# Patient Record
Sex: FEMALE | Race: WHITE | Employment: UNEMPLOYED | URBAN - METROPOLITAN AREA
[De-identification: names, ages, dates, MRNs, and addresses within clinical notes are randomized per-mention and may not be internally consistent; named-entity substitution may affect disease eponyms.]

---

## 2019-01-28 ENCOUNTER — HOSPITAL ENCOUNTER (OUTPATIENT)
Dept: ULTRASOUND IMAGING | Age: 39
Discharge: HOME | End: 2019-01-28
Payer: COMMERCIAL

## 2019-01-28 ENCOUNTER — HOSPITAL ENCOUNTER (OUTPATIENT)
Dept: WOMENS IMAGING | Age: 39
Discharge: HOME | End: 2019-01-28
Payer: COMMERCIAL

## 2019-01-28 DIAGNOSIS — N63.0 LUMP OR MASS IN BREAST: ICD-10-CM

## 2019-01-28 PROCEDURE — G0279 TOMOSYNTHESIS, MAMMO: HCPCS

## 2019-01-28 PROCEDURE — 76642 ULTRASOUND BREAST LIMITED: CPT

## 2023-03-16 ENCOUNTER — HOSPITAL ENCOUNTER (EMERGENCY)
Age: 43
Discharge: HOME | End: 2023-03-16
Attending: EMERGENCY MEDICINE
Payer: COMMERCIAL

## 2023-03-16 ENCOUNTER — APPOINTMENT (OUTPATIENT)
Dept: GENERAL RADIOLOGY | Age: 43
End: 2023-03-16
Payer: COMMERCIAL

## 2023-03-16 VITALS
DIASTOLIC BLOOD PRESSURE: 92 MMHG | HEIGHT: 70 IN | HEART RATE: 88 BPM | BODY MASS INDEX: 30.39 KG/M2 | SYSTOLIC BLOOD PRESSURE: 144 MMHG | OXYGEN SATURATION: 98 % | TEMPERATURE: 98.4 F | RESPIRATION RATE: 16 BRPM | WEIGHT: 212.3 LBS

## 2023-03-16 DIAGNOSIS — M79.672 ACUTE FOOT PAIN, LEFT: Primary | ICD-10-CM

## 2023-03-16 PROCEDURE — 99283 EMERGENCY DEPT VISIT LOW MDM: CPT

## 2023-03-16 PROCEDURE — 6370000000 HC RX 637 (ALT 250 FOR IP): Performed by: EMERGENCY MEDICINE

## 2023-03-16 PROCEDURE — 73630 X-RAY EXAM OF FOOT: CPT

## 2023-03-16 RX ORDER — PREDNISONE 20 MG/1
60 TABLET ORAL ONCE
Status: COMPLETED | OUTPATIENT
Start: 2023-03-16 | End: 2023-03-16

## 2023-03-16 RX ADMIN — PREDNISONE 60 MG: 20 TABLET ORAL at 19:13

## 2023-03-16 ASSESSMENT — PAIN - FUNCTIONAL ASSESSMENT: PAIN_FUNCTIONAL_ASSESSMENT: 0-10

## 2023-03-16 ASSESSMENT — PAIN DESCRIPTION - DESCRIPTORS: DESCRIPTORS: ACHING;SHARP

## 2023-03-16 ASSESSMENT — PAIN DESCRIPTION - ORIENTATION: ORIENTATION: LEFT

## 2023-03-16 ASSESSMENT — PAIN DESCRIPTION - LOCATION: LOCATION: FOOT

## 2023-03-16 ASSESSMENT — PAIN SCALES - GENERAL: PAINLEVEL_OUTOF10: 2

## 2023-03-16 NOTE — ED PROVIDER NOTES
[facility] Emergency Department      Pt Name: Jadon Michaud  MRN: [id number]  [place] 1980  Date of evaluation: 3/16/2023  Provider: Cherise Gray MD  CHIEF COMPLAINT  Chief Complaint   Patient presents with    Foot Pain     Pt. C/o left foot pain onset 2 months ago, no injury, aching pain across top of foot with swelling     HPI  Jadon Michaud is a 36 y.o. female who presents because of left foot pain. She has had it for about 2 months. She has noticed a small amount of swelling to the top of her foot. She denies any known injury. She has been taking ibuprofen for pain and it does help but she is needed to take it often. She is having pain while at rest and with walking now when initially it was just with walking. REVIEW OF SYSTEMS:  No other injury, swelling Pertinent positives and negatives as per the HPI. All other pertinent review of systems reviewed and negative. Nursing notes reviewed. PAST MEDICAL HISTORY  Past Medical History:   Diagnosis Date    Endometriosis      SURGICAL HISTORY  Past Surgical History:   Procedure Laterality Date    ABDOMINOPLASTY      APPENDECTOMY      HERNIA REPAIR Bilateral     LAPAROSCOPY      x2    RHINOPLASTY Bilateral     TONSILLECTOMY       MEDICATIONS:  No current facility-administered medications on file prior to encounter. No current outpatient medications on file prior to encounter. ALLERGIES  Oxycodone-acetaminophen, Amoxicillin, Augmentin [amoxicillin-pot clavulanate], and Sulfa antibiotics  SOCIAL HISTORY:  Social History     Tobacco Use    Smoking status: Never    Smokeless tobacco: Never   Substance Use Topics    Alcohol use: Yes     Comment: very rarely    Drug use: Never     IMMUNIZATIONS:  Noncontributory    PHYSICAL EXAM  VITAL SIGNS:  Blood pressure (!) 144/92, pulse 88, temperature 98.4 °F (36.9 °C), temperature source Oral, resp.  rate 16, height 5' 10\" (1.778 m), weight 212 lb 4.9 oz (96.3 kg), last menstrual period 02/26/2023, SpO2 98 %. Constitutional:  36 y.o. female who does not appear toxic  HENT:  Atraumatic, mucous membranes moist  Eyes:   Conjunctiva clear, no icterus  Neck:  Supple, no signs of injury  Thorax & Lungs:  Respiratory effort normal  Abdomen:  Non distended  Back:  No deformity  Extremity: Foot with mild swelling to the dorsum distally near the base of the toes, no erythema, pedal pulses are normal, capillary refill is normal, ankle appears normal  Skin:  Warm, dry, as above    RESULTS / MDM:    RADIOLOGY:  Plain x-rays were viewed by me:   XR FOOT LEFT (MIN 3 VIEWS)   Final Result   No acute osseous abnormality left foot. ED COURSE:   Medications   predniSONE (DELTASONE) tablet 60 mg (60 mg Oral Given 3/16/23 1913)      History from : Patient  Limitations to history : None  Chronic Conditions:  has a past medical history of Endometriosis. Records Reviewed : None  Disposition Considerations (Tests not ordered but considered, Shared Decision Making, Pt Expectation of Test or Tx.):  MRI not deemed clinically necessary in the ED setting  Discussion with Other Profesionals : None    PROCEDURES:  None    CONSULTATIONS:  None    Emile Rodas is a 36 y.o. female who presented because of a painful foot. We did try a one time does of steroid in the ED. It does not appear infected, though it has been a long time for symptoms such as sprain, gout, tendonitis, etc.  I will refer to orthopedics. Emile Rodas was given appropriate discharge instructions. Referral to follow up provider. Differential Diagnosis:  Fracture, sprain, neurovascular injury, infection, other    FOLLOW UP:    Donya Corea MD  [facility] [address]  [address] [place] [address] Road  [phone]    Schedule an appointment as soon as possible for a visit       [facility]  [address]  [phone]  Go to   If symptoms worsen    FINAL IMPRESSION:    1.  Acute foot pain, left (Please note that I used voice recognition software to generate this note.   Occasionally words are mistranscribed despite my efforts to edit errors.)       German Munson MD  03/17/23 0993

## 2023-03-16 NOTE — ED TRIAGE NOTES
Pt. Stephanie Means left foot pain onset 2 months ago, no injury, aching pain across top of foot with swelling

## 2023-06-01 ENCOUNTER — HOSPITAL ENCOUNTER (OUTPATIENT)
Dept: WOMENS IMAGING | Age: 43
Discharge: HOME | End: 2023-06-01
Payer: COMMERCIAL

## 2023-06-01 DIAGNOSIS — Z12.31 VISIT FOR SCREENING MAMMOGRAM: ICD-10-CM

## 2023-06-01 PROCEDURE — 77063 BREAST TOMOSYNTHESIS BI: CPT

## 2023-08-31 ENCOUNTER — OFFICE VISIT (OUTPATIENT)
Dept: UROGYNECOLOGY | Age: 43
End: 2023-08-31

## 2023-08-31 VITALS
OXYGEN SATURATION: 97 % | WEIGHT: 206.6 LBS | TEMPERATURE: 97.8 F | SYSTOLIC BLOOD PRESSURE: 132 MMHG | DIASTOLIC BLOOD PRESSURE: 89 MMHG | HEART RATE: 71 BPM | BODY MASS INDEX: 29.64 KG/M2 | RESPIRATION RATE: 16 BRPM

## 2023-08-31 DIAGNOSIS — R35.0 URINARY FREQUENCY: Primary | ICD-10-CM

## 2023-08-31 DIAGNOSIS — R39.15 URINARY URGENCY: ICD-10-CM

## 2023-08-31 DIAGNOSIS — N39.3 STRESS INCONTINENCE: ICD-10-CM

## 2023-08-31 LAB
BILIRUBIN, POC: NORMAL
BLOOD URINE, POC: NORMAL
CLARITY, POC: CLEAR
COLOR, POC: YELLOW
EMPTY COUGH STRESS TEST: NORMAL
FIRST SENSATION: 100 CC
FULL COUGH STRESS TEST: NORMAL
GLUCOSE URINE, POC: NORMAL
KETONES, POC: NORMAL
LEUKOCYTE EST, POC: NORMAL
MAX SENSATION: 275 CC
NITRATE, URINE POC: NORMAL
NITRITE, POC: NORMAL
PH, POC: 6.5
POST VOID RESIDUAL (PVR): 30 ML
PROTEIN, POC: NORMAL
RBC URINE, POC: NORMAL
SECOND SENSATION: 190 CC
SPASM: NORMAL
SPECIFIC GRAVITY, POC: 1.01
UROBILINOGEN, POC: NORMAL
WBC URINE, POC: NORMAL

## 2023-08-31 RX ORDER — PANTOPRAZOLE SODIUM 40 MG/1
40 TABLET, DELAYED RELEASE ORAL DAILY
COMMUNITY
Start: 2023-08-10

## 2023-08-31 RX ORDER — FLUOXETINE HYDROCHLORIDE 20 MG/1
CAPSULE ORAL
COMMUNITY
Start: 2023-08-10

## 2023-08-31 ASSESSMENT — ENCOUNTER SYMPTOMS
ABDOMINAL DISTENTION: 1
ABDOMINAL PAIN: 1

## 2023-08-31 NOTE — PROGRESS NOTES
her return for urodynamics and a cystourethroscopy. She was given handouts on her condition. Orders Placed This Encounter   Procedures    POCT Urinalysis no Micro    Cystometrogram     No orders of the defined types were placed in this encounter.       Liz Shaw MD

## 2023-09-08 ENCOUNTER — HOSPITAL ENCOUNTER (OUTPATIENT)
Dept: MRI IMAGING | Age: 43
Discharge: HOME | End: 2023-09-08
Attending: OBSTETRICS & GYNECOLOGY
Payer: COMMERCIAL

## 2023-09-08 DIAGNOSIS — N80.9 ENDOMETRIOTIC CYST: ICD-10-CM

## 2023-09-08 DIAGNOSIS — R10.2 ADNEXAL TENDERNESS, RIGHT: ICD-10-CM

## 2023-09-08 PROCEDURE — 6360000004 HC RX CONTRAST MEDICATION: Performed by: OBSTETRICS & GYNECOLOGY

## 2023-09-08 PROCEDURE — 72197 MRI PELVIS W/O & W/DYE: CPT

## 2023-09-08 PROCEDURE — A9577 INJ MULTIHANCE: HCPCS | Performed by: OBSTETRICS & GYNECOLOGY

## 2023-09-08 RX ADMIN — GADOBENATE DIMEGLUMINE 18 ML: 529 INJECTION, SOLUTION INTRAVENOUS at 08:03

## 2023-09-19 ENCOUNTER — PROCEDURE VISIT (OUTPATIENT)
Dept: UROGYNECOLOGY | Age: 43
End: 2023-09-19
Payer: COMMERCIAL

## 2023-09-19 VITALS
OXYGEN SATURATION: 97 % | RESPIRATION RATE: 16 BRPM | TEMPERATURE: 98 F | SYSTOLIC BLOOD PRESSURE: 125 MMHG | HEART RATE: 79 BPM | DIASTOLIC BLOOD PRESSURE: 86 MMHG

## 2023-09-19 DIAGNOSIS — N39.3 STRESS INCONTINENCE: ICD-10-CM

## 2023-09-19 DIAGNOSIS — R35.0 URINARY FREQUENCY: Primary | ICD-10-CM

## 2023-09-19 DIAGNOSIS — R39.15 URINARY URGENCY: ICD-10-CM

## 2023-09-19 PROCEDURE — 99214 OFFICE O/P EST MOD 30 MIN: CPT | Performed by: OBSTETRICS & GYNECOLOGY

## 2023-09-19 PROCEDURE — 52285 CYSTOSCOPY AND TREATMENT: CPT | Performed by: OBSTETRICS & GYNECOLOGY

## 2023-09-19 NOTE — PROGRESS NOTES
2023     HPI:     Name: Fatmata Encarnacion  YOB: 1980    CC: Fatmata Encarnacion is a 36 y.o. female presenting for an evaluation of stress incontinence . Patient here today for a cystoscopy. HPI: How long have you had this problem? years  Please rate the severity of your problem: moderate  Anything make it better? Ob/Gyn History:    OB History    Para Term  AB Living   2 2 2     2   SAB IAB Ectopic Molar Multiple Live Births             2      # Outcome Date GA Lbr Vaughn/2nd Weight Sex Delivery Anes PTL Lv   2 Term      Vag-Spont   CORNELL   1 Term      Vag-Spont   CORNELL      Birth Comments: System Generated. Please review and update pregnancy details. Obstetric Comments   Has twins     Past Medical History:   Past Medical History:   Diagnosis Date    Depression     Endometriosis     History of adverse reaction to anesthesia      Past Surgical History:   Past Surgical History:   Procedure Laterality Date    ABDOMINOPLASTY      APPENDECTOMY      HERNIA REPAIR Bilateral     LAPAROSCOPY      x2    RHINOPLASTY Bilateral     TONSILLECTOMY       Current Medications:  Current Outpatient Medications   Medication Sig Dispense Refill    FLUoxetine (PROZAC) 20 MG capsule       pantoprazole (PROTONIX) 40 MG tablet Take 1 tablet by mouth daily       No current facility-administered medications for this visit. Allergies:    Allergies   Allergen Reactions    Oxycodone-Acetaminophen Nausea And Vomiting     Headache for 3 days    Amoxicillin Nausea And Vomiting    Augmentin [Amoxicillin-Pot Clavulanate] Nausea And Vomiting    Sulfa Antibiotics Nausea And Vomiting     Social History:   Social History     Socioeconomic History    Marital status:      Spouse name: Not on file    Number of children: Not on file    Years of education: Not on file    Highest education level: Not on file   Occupational History    Not on file   Tobacco Use    Smoking status: Former     Types:

## 2023-10-04 ENCOUNTER — PROCEDURE VISIT (OUTPATIENT)
Dept: UROGYNECOLOGY | Age: 43
End: 2023-10-04

## 2023-10-04 VITALS
HEART RATE: 64 BPM | DIASTOLIC BLOOD PRESSURE: 78 MMHG | SYSTOLIC BLOOD PRESSURE: 135 MMHG | TEMPERATURE: 97.8 F | RESPIRATION RATE: 18 BRPM | OXYGEN SATURATION: 98 %

## 2023-10-04 DIAGNOSIS — R35.0 URINARY FREQUENCY: Primary | ICD-10-CM

## 2023-10-04 DIAGNOSIS — N39.3 STRESS INCONTINENCE: ICD-10-CM

## 2023-10-04 DIAGNOSIS — R39.15 URINARY URGENCY: ICD-10-CM

## 2023-10-04 LAB
BILIRUBIN, POC: NORMAL
BLOOD URINE, POC: NORMAL
CLARITY, POC: CLEAR
COLOR, POC: YELLOW
GLUCOSE URINE, POC: NORMAL
KETONES, POC: NORMAL
LEUKOCYTE EST, POC: NORMAL
NITRITE, POC: NORMAL
PH, POC: 6
PROTEIN, POC: NORMAL
SPECIFIC GRAVITY, POC: 1.02
UROBILINOGEN, POC: 0.2

## 2023-10-04 NOTE — PROGRESS NOTES
Urodynamic Procedure Note    Donalda Duane  1980    Urodynamicist: Dr. Octavia Rodriguez    Equipment: Cait Blanco    Brief History/Indication:    Patient is a 36 y.o. female with subjective complaints of urinary frequency, urgency, and stress incontinence for a urodynamic evaluation. Cystometrogram   WBC Urine, POC   Date Value Ref Range Status   08/31/2023 neg  Final     RBC Urine, POC   Date Value Ref Range Status   08/31/2023 neg  Final     Nitrate, UA POC   Date Value Ref Range Status   08/31/2023 neg  Final     post void residual   Date Value Ref Range Status   08/31/2023 30 ml Final     FIRST SENSATION   Date Value Ref Range Status   08/31/2023 100 cc Final     SECOND SENSATION   Date Value Ref Range Status   08/31/2023 190 cc Final     MAX SENSATION   Date Value Ref Range Status   08/31/2023 275 cc Final     EMPTY COUGH STRESS TEST   Date Value Ref Range Status   08/31/2023 pos  Final     FULL COUGH STRESS TEST   Date Value Ref Range Status   08/31/2023 pos sitting & standing  Final     SPASM   Date Value Ref Range Status   08/31/2023 neg  Final       Pelvic Organ Prolapse Quantification  No data recorded No data recorded No data recorded   No data recorded No data recorded No data recorded   No data recorded No data recorded No data recorded   No data recorded        Procedure: The Patient was taken to the Urodynamics suite and a free urine flow was obtained followed by catheterization for residual urine. A double-lumen urodynamic catheter was introduced to the bladder for measuring bladder pressure, and for filling. EMG patch electrodes were placed perianally for recording activity of the anal sphincter. A vaginal catheter was inserted to record the abdominal pressure. The entire process was displayed and analyzed using the [employer] Urodynamic machine and software. Findings:   No results found for this visit on 10/04/23.     Uroflow:  No Uroflowmeter present at this time    Voided Volume: 375ml  PVR:

## 2023-11-10 ENCOUNTER — HOSPITAL ENCOUNTER (OUTPATIENT)
Dept: PREADMISSION TESTING | Age: 43
End: 2023-11-10
Payer: COMMERCIAL

## 2023-11-10 DIAGNOSIS — Z01.818 ENCOUNTER FOR PREADMISSION TESTING: ICD-10-CM

## 2023-11-10 LAB
ABO + RH BLD: NORMAL
BLD GP AB SCN SERPL QL: NORMAL

## 2023-11-10 PROCEDURE — 86901 BLOOD TYPING SEROLOGIC RH(D): CPT

## 2023-11-10 PROCEDURE — 36415 COLL VENOUS BLD VENIPUNCTURE: CPT

## 2023-11-10 PROCEDURE — 86850 RBC ANTIBODY SCREEN: CPT

## 2023-11-10 PROCEDURE — 86900 BLOOD TYPING SEROLOGIC ABO: CPT

## 2023-11-13 ENCOUNTER — TELEPHONE (OUTPATIENT)
Dept: UROGYNECOLOGY | Age: 43
End: 2023-11-13

## 2023-11-14 ENCOUNTER — TELEPHONE (OUTPATIENT)
Dept: UROGYNECOLOGY | Age: 43
End: 2023-11-14

## 2023-11-14 NOTE — TELEPHONE ENCOUNTER
Surgery scheduled for 11/20/23. Called patient and allowed time for any additional questions prior to surgery. Advised to stop all vitamins, herbal supplements, Aspirin, or NSAIDS the week prior to surgery. Confirmed date and time of surgery with patient as well as post op appointment. Answered all questions patient had in regards to upcoming surgery - Asked patient to call office if there are any additional questions.

## 2023-11-17 ENCOUNTER — ANESTHESIA EVENT (OUTPATIENT)
Dept: OPERATING ROOM | Age: 43
End: 2023-11-17
Payer: COMMERCIAL

## 2023-11-20 ENCOUNTER — HOSPITAL ENCOUNTER (OUTPATIENT)
Age: 43
Setting detail: OUTPATIENT SURGERY
Discharge: HOME | End: 2023-11-20
Attending: OBSTETRICS & GYNECOLOGY | Admitting: OBSTETRICS & GYNECOLOGY
Payer: COMMERCIAL

## 2023-11-20 ENCOUNTER — ANESTHESIA (OUTPATIENT)
Dept: OPERATING ROOM | Age: 43
End: 2023-11-20
Payer: COMMERCIAL

## 2023-11-20 VITALS
SYSTOLIC BLOOD PRESSURE: 133 MMHG | TEMPERATURE: 97.9 F | BODY MASS INDEX: 29.34 KG/M2 | RESPIRATION RATE: 16 BRPM | HEART RATE: 82 BPM | HEIGHT: 70 IN | OXYGEN SATURATION: 96 % | DIASTOLIC BLOOD PRESSURE: 72 MMHG | WEIGHT: 204.92 LBS

## 2023-11-20 DIAGNOSIS — R10.2 PELVIC PAIN IN FEMALE: ICD-10-CM

## 2023-11-20 DIAGNOSIS — N80.9 ENDOMETRIOSIS DETERMINED BY LAPAROSCOPY: ICD-10-CM

## 2023-11-20 DIAGNOSIS — Z90.710 H/O: HYSTERECTOMY: ICD-10-CM

## 2023-11-20 DIAGNOSIS — Z01.818 ENCOUNTER FOR PREADMISSION TESTING: Primary | ICD-10-CM

## 2023-11-20 DIAGNOSIS — N39.3 GENUINE STRESS INCONTINENCE, FEMALE: ICD-10-CM

## 2023-11-20 LAB
ABO + RH BLD: NORMAL
BLD GP AB SCN SERPL QL: NORMAL
HCG UR QL: NEGATIVE

## 2023-11-20 PROCEDURE — 6360000002 HC RX W HCPCS: Performed by: ANESTHESIOLOGY

## 2023-11-20 PROCEDURE — 86850 RBC ANTIBODY SCREEN: CPT

## 2023-11-20 PROCEDURE — 2580000003 HC RX 258: Performed by: OBSTETRICS & GYNECOLOGY

## 2023-11-20 PROCEDURE — 3700000000 HC ANESTHESIA ATTENDED CARE: Performed by: OBSTETRICS & GYNECOLOGY

## 2023-11-20 PROCEDURE — C1771 REP DEV, URINARY, W/SLING: HCPCS | Performed by: OBSTETRICS & GYNECOLOGY

## 2023-11-20 PROCEDURE — 7100000010 HC PHASE II RECOVERY - FIRST 15 MIN: Performed by: OBSTETRICS & GYNECOLOGY

## 2023-11-20 PROCEDURE — 3600000019 HC SURGERY ROBOT ADDTL 15MIN: Performed by: OBSTETRICS & GYNECOLOGY

## 2023-11-20 PROCEDURE — 2720000010 HC SURG SUPPLY STERILE: Performed by: OBSTETRICS & GYNECOLOGY

## 2023-11-20 PROCEDURE — 6360000002 HC RX W HCPCS: Performed by: OBSTETRICS & GYNECOLOGY

## 2023-11-20 PROCEDURE — 6370000000 HC RX 637 (ALT 250 FOR IP): Performed by: ANESTHESIOLOGY

## 2023-11-20 PROCEDURE — S2900 ROBOTIC SURGICAL SYSTEM: HCPCS | Performed by: OBSTETRICS & GYNECOLOGY

## 2023-11-20 PROCEDURE — 3700000001 HC ADD 15 MINUTES (ANESTHESIA): Performed by: OBSTETRICS & GYNECOLOGY

## 2023-11-20 PROCEDURE — 2500000003 HC RX 250 WO HCPCS

## 2023-11-20 PROCEDURE — 86901 BLOOD TYPING SEROLOGIC RH(D): CPT

## 2023-11-20 PROCEDURE — 7100000001 HC PACU RECOVERY - ADDTL 15 MIN: Performed by: OBSTETRICS & GYNECOLOGY

## 2023-11-20 PROCEDURE — 2500000003 HC RX 250 WO HCPCS: Performed by: OBSTETRICS & GYNECOLOGY

## 2023-11-20 PROCEDURE — 7100000000 HC PACU RECOVERY - FIRST 15 MIN: Performed by: OBSTETRICS & GYNECOLOGY

## 2023-11-20 PROCEDURE — A4217 STERILE WATER/SALINE, 500 ML: HCPCS | Performed by: OBSTETRICS & GYNECOLOGY

## 2023-11-20 PROCEDURE — 86900 BLOOD TYPING SEROLOGIC ABO: CPT

## 2023-11-20 PROCEDURE — 84703 CHORIONIC GONADOTROPIN ASSAY: CPT

## 2023-11-20 PROCEDURE — 2500000003 HC RX 250 WO HCPCS: Performed by: NURSE ANESTHETIST, CERTIFIED REGISTERED

## 2023-11-20 PROCEDURE — 88307 TISSUE EXAM BY PATHOLOGIST: CPT

## 2023-11-20 PROCEDURE — 6360000002 HC RX W HCPCS: Performed by: NURSE ANESTHETIST, CERTIFIED REGISTERED

## 2023-11-20 PROCEDURE — 2709999900 HC NON-CHARGEABLE SUPPLY: Performed by: OBSTETRICS & GYNECOLOGY

## 2023-11-20 PROCEDURE — 3600000009 HC SURGERY ROBOT BASE: Performed by: OBSTETRICS & GYNECOLOGY

## 2023-11-20 PROCEDURE — 58660 LAPAROSCOPY LYSIS: CPT | Performed by: SURGERY

## 2023-11-20 PROCEDURE — 57240 ANTERIOR COLPORRHAPHY: CPT | Performed by: OBSTETRICS & GYNECOLOGY

## 2023-11-20 PROCEDURE — 57288 REPAIR BLADDER DEFECT: CPT | Performed by: OBSTETRICS & GYNECOLOGY

## 2023-11-20 PROCEDURE — 7100000011 HC PHASE II RECOVERY - ADDTL 15 MIN: Performed by: OBSTETRICS & GYNECOLOGY

## 2023-11-20 PROCEDURE — 2580000003 HC RX 258: Performed by: ANESTHESIOLOGY

## 2023-11-20 DEVICE — SUPRAPUBIC MID-URETHRAL SLING
Type: IMPLANTABLE DEVICE | Site: VAGINA | Status: FUNCTIONAL
Brand: LYNX™ BLUE SYSTEM

## 2023-11-20 RX ORDER — BUPIVACAINE HYDROCHLORIDE 2.5 MG/ML
INJECTION, SOLUTION EPIDURAL; INFILTRATION; INTRACAUDAL
Status: COMPLETED | OUTPATIENT
Start: 2023-11-20 | End: 2023-11-20

## 2023-11-20 RX ORDER — ACETAMINOPHEN 325 MG/1
650 TABLET ORAL
Status: DISCONTINUED | OUTPATIENT
Start: 2023-11-20 | End: 2023-11-20 | Stop reason: HOSPADM

## 2023-11-20 RX ORDER — PHENYLEPHRINE HCL IN 0.9% NACL 1 MG/10 ML
SYRINGE (ML) INTRAVENOUS PRN
Status: DISCONTINUED | OUTPATIENT
Start: 2023-11-20 | End: 2023-11-20 | Stop reason: SDUPTHER

## 2023-11-20 RX ORDER — FENTANYL CITRATE 50 UG/ML
50 INJECTION, SOLUTION INTRAMUSCULAR; INTRAVENOUS EVERY 5 MIN PRN
Status: DISCONTINUED | OUTPATIENT
Start: 2023-11-20 | End: 2023-11-20 | Stop reason: HOSPADM

## 2023-11-20 RX ORDER — ESTRADIOL 0.04 MG/D
1 FILM, EXTENDED RELEASE TRANSDERMAL
Qty: 8 PATCH | Refills: 3 | Status: SHIPPED | OUTPATIENT
Start: 2023-11-20

## 2023-11-20 RX ORDER — HYDROCODONE BITARTRATE AND ACETAMINOPHEN 5; 325 MG/1; MG/1
1 TABLET ORAL EVERY 6 HOURS PRN
Qty: 28 TABLET | Refills: 0 | Status: SHIPPED | OUTPATIENT
Start: 2023-11-20 | End: 2023-11-27

## 2023-11-20 RX ORDER — FENTANYL CITRATE 0.05 MG/ML
50 INJECTION, SOLUTION INTRAMUSCULAR; INTRAVENOUS EVERY 5 MIN PRN
Status: DISCONTINUED | OUTPATIENT
Start: 2023-11-20 | End: 2023-11-20

## 2023-11-20 RX ORDER — OXYCODONE HYDROCHLORIDE 10 MG/1
10 TABLET ORAL PRN
Status: DISCONTINUED | OUTPATIENT
Start: 2023-11-20 | End: 2023-11-20

## 2023-11-20 RX ORDER — OXYCODONE HYDROCHLORIDE 5 MG/1
5 TABLET ORAL PRN
Status: DISCONTINUED | OUTPATIENT
Start: 2023-11-20 | End: 2023-11-20

## 2023-11-20 RX ORDER — ONDANSETRON 4 MG/1
4 TABLET, FILM COATED ORAL EVERY 6 HOURS PRN
Qty: 30 TABLET | Refills: 1 | Status: SHIPPED | OUTPATIENT
Start: 2023-11-20

## 2023-11-20 RX ORDER — SODIUM CHLORIDE 0.9 % (FLUSH) 0.9 %
5-40 SYRINGE (ML) INJECTION EVERY 12 HOURS SCHEDULED
Status: DISCONTINUED | OUTPATIENT
Start: 2023-11-20 | End: 2023-11-20 | Stop reason: HOSPADM

## 2023-11-20 RX ORDER — IBUPROFEN 800 MG/1
800 TABLET ORAL EVERY 8 HOURS PRN
Qty: 40 TABLET | Refills: 1 | Status: SHIPPED | OUTPATIENT
Start: 2023-11-20

## 2023-11-20 RX ORDER — PROCHLORPERAZINE EDISYLATE 5 MG/ML
5 INJECTION INTRAMUSCULAR; INTRAVENOUS
Status: COMPLETED | OUTPATIENT
Start: 2023-11-20 | End: 2023-11-20

## 2023-11-20 RX ORDER — SODIUM CHLORIDE 9 MG/ML
INJECTION, SOLUTION INTRAVENOUS PRN
Status: DISCONTINUED | OUTPATIENT
Start: 2023-11-20 | End: 2023-11-20 | Stop reason: HOSPADM

## 2023-11-20 RX ORDER — ONDANSETRON 2 MG/ML
INJECTION INTRAMUSCULAR; INTRAVENOUS PRN
Status: DISCONTINUED | OUTPATIENT
Start: 2023-11-20 | End: 2023-11-20 | Stop reason: SDUPTHER

## 2023-11-20 RX ORDER — HYDROCODONE BITARTRATE AND ACETAMINOPHEN 5; 325 MG/1; MG/1
2 TABLET ORAL
Status: COMPLETED | OUTPATIENT
Start: 2023-11-20 | End: 2023-11-20

## 2023-11-20 RX ORDER — SODIUM CHLORIDE 0.9 % (FLUSH) 0.9 %
5-40 SYRINGE (ML) INJECTION PRN
Status: DISCONTINUED | OUTPATIENT
Start: 2023-11-20 | End: 2023-11-20 | Stop reason: HOSPADM

## 2023-11-20 RX ORDER — DEXAMETHASONE SODIUM PHOSPHATE 4 MG/ML
INJECTION, SOLUTION INTRA-ARTICULAR; INTRALESIONAL; INTRAMUSCULAR; INTRAVENOUS; SOFT TISSUE PRN
Status: DISCONTINUED | OUTPATIENT
Start: 2023-11-20 | End: 2023-11-20 | Stop reason: SDUPTHER

## 2023-11-20 RX ORDER — LIDOCAINE HYDROCHLORIDE 20 MG/ML
INJECTION, SOLUTION EPIDURAL; INFILTRATION; INTRACAUDAL; PERINEURAL PRN
Status: DISCONTINUED | OUTPATIENT
Start: 2023-11-20 | End: 2023-11-20 | Stop reason: SDUPTHER

## 2023-11-20 RX ORDER — ROCURONIUM BROMIDE 10 MG/ML
INJECTION, SOLUTION INTRAVENOUS PRN
Status: DISCONTINUED | OUTPATIENT
Start: 2023-11-20 | End: 2023-11-20 | Stop reason: SDUPTHER

## 2023-11-20 RX ORDER — PROPOFOL 10 MG/ML
INJECTION, EMULSION INTRAVENOUS PRN
Status: DISCONTINUED | OUTPATIENT
Start: 2023-11-20 | End: 2023-11-20 | Stop reason: SDUPTHER

## 2023-11-20 RX ORDER — ONDANSETRON 2 MG/ML
4 INJECTION INTRAMUSCULAR; INTRAVENOUS
Status: DISCONTINUED | OUTPATIENT
Start: 2023-11-20 | End: 2023-11-20 | Stop reason: HOSPADM

## 2023-11-20 RX ORDER — FENTANYL CITRATE 50 UG/ML
INJECTION, SOLUTION INTRAMUSCULAR; INTRAVENOUS PRN
Status: DISCONTINUED | OUTPATIENT
Start: 2023-11-20 | End: 2023-11-20 | Stop reason: SDUPTHER

## 2023-11-20 RX ORDER — SCOLOPAMINE TRANSDERMAL SYSTEM 1 MG/1
1 PATCH, EXTENDED RELEASE TRANSDERMAL ONCE
Status: DISCONTINUED | OUTPATIENT
Start: 2023-11-20 | End: 2023-11-20 | Stop reason: HOSPADM

## 2023-11-20 RX ORDER — MAGNESIUM HYDROXIDE 1200 MG/15ML
LIQUID ORAL CONTINUOUS PRN
Status: COMPLETED | OUTPATIENT
Start: 2023-11-20 | End: 2023-11-20

## 2023-11-20 RX ORDER — HYDROCODONE BITARTRATE AND ACETAMINOPHEN 5; 325 MG/1; MG/1
1 TABLET ORAL
Status: COMPLETED | OUTPATIENT
Start: 2023-11-20 | End: 2023-11-20

## 2023-11-20 RX ORDER — DOCUSATE SODIUM 100 MG/1
100 CAPSULE, LIQUID FILLED ORAL 2 TIMES DAILY PRN
Qty: 60 CAPSULE | Refills: 1 | Status: SHIPPED | OUTPATIENT
Start: 2023-11-20

## 2023-11-20 RX ORDER — MIDAZOLAM HYDROCHLORIDE 1 MG/ML
INJECTION INTRAMUSCULAR; INTRAVENOUS PRN
Status: DISCONTINUED | OUTPATIENT
Start: 2023-11-20 | End: 2023-11-20 | Stop reason: SDUPTHER

## 2023-11-20 RX ADMIN — Medication 100 MCG: at 08:29

## 2023-11-20 RX ADMIN — HYDROCODONE BITARTRATE AND ACETAMINOPHEN 2 TABLET: 5; 325 TABLET ORAL at 12:56

## 2023-11-20 RX ADMIN — FENTANYL CITRATE 50 MCG: 50 INJECTION INTRAMUSCULAR; INTRAVENOUS at 07:34

## 2023-11-20 RX ADMIN — FENTANYL CITRATE 50 MCG: 0.05 INJECTION, SOLUTION INTRAMUSCULAR; INTRAVENOUS at 10:28

## 2023-11-20 RX ADMIN — PROCHLORPERAZINE EDISYLATE 5 MG: 5 INJECTION INTRAMUSCULAR; INTRAVENOUS at 10:26

## 2023-11-20 RX ADMIN — PROPOFOL 30 MG: 10 INJECTION, EMULSION INTRAVENOUS at 08:15

## 2023-11-20 RX ADMIN — ONDANSETRON 4 MG: 2 INJECTION INTRAMUSCULAR; INTRAVENOUS at 09:45

## 2023-11-20 RX ADMIN — SUGAMMADEX 300 MG: 100 INJECTION, SOLUTION INTRAVENOUS at 09:42

## 2023-11-20 RX ADMIN — LIDOCAINE HYDROCHLORIDE 100 MG: 20 INJECTION, SOLUTION EPIDURAL; INFILTRATION; INTRACAUDAL; PERINEURAL at 07:37

## 2023-11-20 RX ADMIN — Medication 100 MCG: at 08:27

## 2023-11-20 RX ADMIN — FENTANYL CITRATE 50 MCG: 50 INJECTION INTRAMUSCULAR; INTRAVENOUS at 08:18

## 2023-11-20 RX ADMIN — FENTANYL CITRATE 50 MCG: 50 INJECTION, SOLUTION INTRAMUSCULAR; INTRAVENOUS at 10:46

## 2023-11-20 RX ADMIN — CEFOXITIN SODIUM 2000 MG: 2 POWDER, FOR SOLUTION INTRAVENOUS at 07:30

## 2023-11-20 RX ADMIN — ROCURONIUM BROMIDE 40 MG: 10 INJECTION INTRAVENOUS at 07:40

## 2023-11-20 RX ADMIN — ROCURONIUM BROMIDE 30 MG: 10 INJECTION INTRAVENOUS at 08:15

## 2023-11-20 RX ADMIN — FENTANYL CITRATE 50 MCG: 50 INJECTION INTRAMUSCULAR; INTRAVENOUS at 07:28

## 2023-11-20 RX ADMIN — ROCURONIUM BROMIDE 20 MG: 10 INJECTION INTRAVENOUS at 09:05

## 2023-11-20 RX ADMIN — PROPOFOL 200 MG: 10 INJECTION, EMULSION INTRAVENOUS at 07:39

## 2023-11-20 RX ADMIN — SODIUM CHLORIDE: 9 INJECTION, SOLUTION INTRAVENOUS at 06:33

## 2023-11-20 RX ADMIN — DEXAMETHASONE SODIUM PHOSPHATE 8 MG: 4 INJECTION, SOLUTION INTRAMUSCULAR; INTRAVENOUS at 07:50

## 2023-11-20 RX ADMIN — MIDAZOLAM 2 MG: 1 INJECTION INTRAMUSCULAR; INTRAVENOUS at 07:25

## 2023-11-20 RX ADMIN — ROCURONIUM BROMIDE 10 MG: 10 INJECTION INTRAVENOUS at 07:38

## 2023-11-20 ASSESSMENT — PAIN DESCRIPTION - DESCRIPTORS
DESCRIPTORS: PRESSURE;ACHING
DESCRIPTORS: ACHING;PRESSURE
DESCRIPTORS: PRESSURE
DESCRIPTORS: DISCOMFORT;PRESSURE
DESCRIPTORS: ACHING
DESCRIPTORS: ACHING;PRESSURE
DESCRIPTORS: CRAMPING
DESCRIPTORS: DISCOMFORT;PRESSURE

## 2023-11-20 ASSESSMENT — PAIN DESCRIPTION - ONSET
ONSET: ON-GOING

## 2023-11-20 ASSESSMENT — PAIN - FUNCTIONAL ASSESSMENT
PAIN_FUNCTIONAL_ASSESSMENT: PREVENTS OR INTERFERES SOME ACTIVE ACTIVITIES AND ADLS
PAIN_FUNCTIONAL_ASSESSMENT: 0-10
PAIN_FUNCTIONAL_ASSESSMENT: PREVENTS OR INTERFERES SOME ACTIVE ACTIVITIES AND ADLS

## 2023-11-20 ASSESSMENT — PAIN DESCRIPTION - LOCATION
LOCATION: ABDOMEN

## 2023-11-20 ASSESSMENT — PAIN DESCRIPTION - PAIN TYPE
TYPE: SURGICAL PAIN

## 2023-11-20 ASSESSMENT — PAIN SCALES - GENERAL
PAINLEVEL_OUTOF10: 0
PAINLEVEL_OUTOF10: 8
PAINLEVEL_OUTOF10: 5
PAINLEVEL_OUTOF10: 6
PAINLEVEL_OUTOF10: 9
PAINLEVEL_OUTOF10: 8
PAINLEVEL_OUTOF10: 8

## 2023-11-20 ASSESSMENT — PAIN DESCRIPTION - FREQUENCY
FREQUENCY: CONTINUOUS

## 2023-11-20 ASSESSMENT — PAIN DESCRIPTION - ORIENTATION
ORIENTATION: LOWER
ORIENTATION: ANTERIOR;LOWER
ORIENTATION: LOWER

## 2023-11-20 NOTE — H&P
H&P Update    Pt Name: Leroy Pond  MRN: [id number]  YOB: 1980  Date of evaluation: 11/20/2023    [x] I have examined the patient and reviewed the H&P/Consult and there are no changes to the patient or plans. [] I have examined the patient and reviewed the H&P/Consult and have noted the following changes:     Plan for Robotic-assisted TLH, BSO for endometriosis.          Janina Mclean MD,MD  Electronically signed 11/20/2023 at 7:18 AM

## 2023-11-20 NOTE — PROGRESS NOTES
Ambulated patient to BR at this time.
Follow Up Prior to Surgery    DOS:   :1980    Missing Orders:Called pharmacy due to allergies Aden states Dr Lissa Galvin needs to clarify order and send new order.  [phone]    Update :  Emy BAXTER office called [phone]  Original orders that were sent work for cefoxitin DOS show no allergy or contraindication
Patient to PACU from OR. Pt asleep. VS stable (see flowsheet) on 4L NC. Surgical dressing clean, dry and intact x 6. Abd soft.
Phoned Dr. Kerri Cabrera to inform of void of 200 and 0 PVR. Unable to reach. LMOM.
Pt VSS on room air. Abd soft. Surgical sites clean, dry and intact. Pt awakens easily to voice. Pt drowsy. When awakened pt states pain is 5/10 and tolerable.
Pt awake on arrival to phase II. VSS. Pain in abdomen 8/10, pressure and urge to void. Walked pt to bathroom to void in hat. Little nausea after moving. Increased IV fluids and instructed pt to pull cord when ready. Pt remains sitting on toilet.
Pt unable to void. Nausea with movement. Back to stretcher and lying on right side. Pt tolerates PO. Norco 2 tablets PO given for pain 8/10 in lower abdomen. Family to bedside.
Report received from [facility]. Pt tolerates PO. Occasional nausea with movement but no emesis. Pt wants to go home. VSS. Pain 6/10 in lower abdomen. Walked to bathroom and voided. Back to room and resting on stretcher. Discharge instructions reviewed with pt and father and friend. All verbalize an understanding of instructions. Pt dressed with friend's assistance. Wheeled pt to father's car for discharge.
Vaginal Sweep Documentation     Vaginal prep sponge count performed by Sallyann Severe RN and Svetlana Dacosta RN. Count correct. Vaginal sweep performed by   at 0940. No foreign objects or vaginal tears noted.
[facility] Pre-Admission Testing Electronic Communication Worksheet for OR/ENDO Procedures        Patient: Christine Milton    DOS: 11/20    Arrival Time: 0600    Surgery Time:0730    Meds to Bed:  [x] YES    []  NO    Transportation Confirmed: [] YES    []  NO    History and Physical:  [x] YES    []  NO  [] N/A  If yes, please list doctor or Urgent Care and date of H&P: Dr Francis under encounters    Additional Clearance(Cardiac, Pulmonary, etc):  [] YES    []  NO    Pre-Admission Testing Visit:  [x] YES    []  NO If no, do labs/testing need to be done DOS?   [] YES    []  NO11/10    Medication Reconciliation Complete:  [x] YES    []  NO        Additional Notes:                Interview Complete: [x] YES    []  NO          Palmer Coe RN  5:28 PM
toes      For your safety, please do not wear any jewelry or body piercing's on the day of surgery. All jewelry must be removed. If you have dentures, they will be removed before going to operating room. For your convenience, we will provide you with a container. If you wear contact lenses or glasses, they will be removed, please bring a case for them. If you have a living will and a durable power of  for healthcare, please bring in a copy. As part of our patient safety program to minimize surgical site infections, we ask you to do the following:    Please notify your surgeon if you develop any illness between         now and the  day of your surgery. This includes a cough, cold, fever, sore throat, nausea,         or vomiting, and diarrhea, etc.   Please notify your surgeon if you experience dizziness, shortness         of breath or blurred vision between now and the time of your surgery. Do not shave your operative site 96 hours prior to surgery. For face and neck surgery, men may use an electric razor 48 hours   prior to surgery. You may shower the night before surgery or the morning of   your surgery with an antibacterial soap. You will need to bring a photo ID and insurance card    [facility] has an onsite pharmacy, would you like to utilize our pharmacy     If you will be staying overnight and use a C-pap machine, please bring   your C-pap to hospital     Our goal is to provide you with excellent care, therefore, visitors will be limited to two(2) in the room at a time so that we may focus on providing this care for you. Please contact pre-admission testing if you have any further questions. [facility] phone number:  [address] [place] fax number:  [phone]  Please note these are generalized instructions for all surgical cases, you may be provided with more specific instructions according to your surgery.     C-Difficile admission

## 2023-11-20 NOTE — ANESTHESIA POSTPROCEDURE EVALUATION
Department of Anesthesiology  Postprocedure Note    Patient: Daquan Haynes  MRN: [id number]  YOB: 1980  Date of evaluation: 11/20/2023      Procedure Summary     Date: 11/20/23 Room / Location: [address] [place] [address]    Anesthesia Start: 1499 Anesthesia Stop: 1011    Procedures:       ROBOTIC HYSTERECTOMY WITH BILATERAL SALPINGO-OOPHORECTOMY WITH EXCISION OF ENDOMETRIOSIS, LYSIS OF ADHESIONS (Bilateral: Pelvis)      RETROPUBIC SLING (Vagina )      CYSTOSCOPY (Bladder) Diagnosis:       Pelvic pain in female      Endometriosis determined by laparoscopy      Genuine stress incontinence, female      (Pelvic pain in female [R10.2])      (Endometriosis determined by laparoscopy [N80.9])      (Genuine stress incontinence, female [N39.3])    Surgeons: Ramez Cade MD; Cee Cavazos MD Responsible Provider: Mc James MD    Anesthesia Type: General ASA Status: 2          Anesthesia Type: General    Kaylene Phase I: Kaylene Score: 8    Kaylene Phase II:        Anesthesia Post Evaluation    Patient location during evaluation: bedside  Patient participation: complete - patient participated  Level of consciousness: awake and alert  Pain score: 3  Airway patency: patent  Nausea & Vomiting: no vomiting  Complications: no  Cardiovascular status: blood pressure returned to baseline  Respiratory status: acceptable  Hydration status: euvolemic  Pain management: adequate

## 2023-11-20 NOTE — BRIEF OP NOTE
Brief Postoperative Note      Patient: London Villavicencio  YOB: 1980  MRN: [id number]    Date of Procedure: 11/20/2023    Pre-Op Diagnosis Codes:     * Pelvic pain in female [R10.2]     * Endometriosis determined by laparoscopy [N80.9]     * Genuine stress incontinence, female [N39.3]    Post-Op Diagnosis:  abdominal and pelvic adhesive disease       Procedure(s):  ROBOTIC HYSTERECTOMY WITH BILATERAL SALPINGO-OOPHORECTOMY WITH EXCISION OF ENDOMETRIOSIS, LYSIS OF ADHESIONS  RETROPUBIC SLING  CYSTOSCOPY    Surgeon(s):  MD Baljinder Colorado MD Afton Sessions, MD    Assistant:  Surgical Assistant: Della Swan    Anesthesia: General    Estimated Blood Loss (mL): Minimal    Complications: None    Specimens:   ID Type Source Tests Collected by Time Destination   A : A) Uterus, Cervix, Bilateral fallopian tubes and ovaries Tissue Tissue SURGICAL PATHOLOGY Eloina Marquez MD 11/20/2023 8782        Implants:  Implant Name Type Inv. Item Serial No.  Lot No. LRB No. Used Action   SYSTEM SLNG CRISTAL [facility]PUB [id number]  SYSTEM SLNG CRISTAL SUPRPUB MID [facility] [id number] N/A 1 Implanted         Drains:   [REMOVED] Urinary Catheter 11/20/23 River[place] (Removed)       Findings: adhesions between the bowel and right abdominal side wall with dilation of the colon. Small adhesions between the bowel and right fallopian tube, otherwise, normal pelvic cavity.       Electronically signed by Eloina Marquez MD on 11/20/2023 at 9:48 AM

## 2023-11-20 NOTE — ADDENDUM NOTE
Addendum  created 11/20/23 1036 by Alysa Medina MD    Order list changed, Pharmacy for encounter modified

## 2023-11-20 NOTE — OP NOTE
Krystin Chahal    DATE OF OPERATION: 11/20/23    PREOPERATIVE DIAGNOSIS:   Stress urinary incontinence    POSTOPERATIVE DIAGNOSIS:   Stress urinary incontinence    PROCEDURE:   Retropubic Suburethral sling (Lynx)   Urethrocele Repair   Cystourethroscopy     ESTIMATED BLOOD LOSS: less than 20 mL     UOP: NA mL     IV FLUIDS: NA  mL     ANESTHESIA: General     COMPLICATIONS: None     FINDINGS: Normal bladder mucosa and urethra on cystoscopy. SURGEON: Ben Silverman MD    INDICATIONS: This is a 68-year-old who presented with urinary incontinence and was going to have a hysterectomy by Dr Carlos St. Preoperative urodynamic testing confirmed stress incontinence. The patient was counseled on the risks, benefits and alternatives for a sling. She was counseled on the risks including but not limited to bleeding, infection, damage to the bowel/bladder/Ureters/nerves/blood vessels, need for further surgery, need for exploratory laparotomy, risks of postoperative voiding/sexual dysfunction, risks of mesh erosion, the risks of recurrent incontinence and the anesthesia risks. The patient understood these risks and desired to proceed with the procedure. OPERATIVE NOTE: The patient was taken to the operating room and general anesthesia was found to be adequate. She was prepped and draped in the normal sterile fashion and placed in [address] [place] [address]. Preoperative antibiotics were administered in the patient holding area. A townsend catheter was then placed in the bladder. RETROPUBIC SUBURETHRAL SLING: The bladder was completely drained using the Townsend catheter. Using Allis clamps, the epithelium under the urethra was grasped and injected with 1% lidocaine with epinephrine. An incision was made with the scalpel, and the lateral edges grasped with Allis clamps. Two tunnels were developed bilaterally until the pubic rami were palpated. Areas on the skin were marked 2 cm lateral to the midline above the symphysis pubis.
case.        Pamela Hurd MD    D: 11/20/2023 12:11:25       T: 11/20/2023 13:05:25     [email or web address]  Job#: [id number]     Doc#: [id number]    CC:
opened. The IP ligament identified, and once assured to be well above the course of the ureter, it was clamped, cauterized with bipolar cautery, and cut. The anterior leaf was extended and bladder flap created over the lower uterine segment and cervix. The posterior leaf was taken down sharply and bluntly and the uterine vessels skeletonized. Once the bladder was well below the surgical field, the uterine vessels were clamped, cauterized, and cut at the level of the internal cervical os. Similarly, on the patient's left, the ureter was identified. The IP and round ligaments were clamped, cauterized, and cut. The anterior and posterior leaves of the broad ligament were opened. The remainder of the bladder flap was created and the bladder dissected off the lower uterine segment and uterine cervix. The uterine vessels were skeletonized, clamped, cauterized, and cut. A colpotomy was then created with monopolar cautery circumferentially, superior to the bladder flap, and superior to the uterosacral arch. The uterus with cervix and bilateral fallopian tubes was then removed through the colpotomy intact and without difficulty. The cuff was irrigated and closed in a running fashion with V-lock suture incorporating the posterior peritoneum and bilateral uterosacral ligaments bilaterally. The vaginal cuff and pedicles were inspected and were hemostatic. The abdominal pressure was dropped and the areas remained hemostatic. Galo powder was placed for additional hemostatic support. The robot was then undocked. The townsend catheter was removed. Diagnostic cystoscopy performed and the bladder was without injury. Brisk ureteral jets were visualized bilaterally. The cystoscope was removed and bladder drained. The gas was allowed to escape from the abdomen and the ports were removed under direct visualization. The skin was closed with 4-0 vicryl and skin glue.   The vagina was inspected and found to

## 2023-11-20 NOTE — DISCHARGE INSTRUCTIONS
Department of Obstetrics and Gynecology   Gynecologic Postoperative Discharge Instructions    WHAT ACTIVITY CAN I DO? Avoid strenuous physical activity and do not lift anything greater than 20 pounds until your 6-week post-operative visit. This includes no pushing and pulling as well as lifting (ie - vacuum). You should not drive for two weeks. You may drive after two weeks, but avoid driving while taking pain medications. You can:  Walk up/down steps   Go for walks   Ride as a passenger in the car   [address]:  You should discuss with your doctor when to return to work. Avoid sexual intercourse until your six-week post-operative check-up and cleared by your physician. WHAT SHOULD I EAT? To decrease post-operative nausea, try to eat small frequent meals. Eat foods high in protein such as meat, fish, eggs, and dairy products. Eat foods with vitamin C such as citrus fruits. Consider taking a single multivitamin, which you can buy at any pharmacy. Drink lots of fluids. CONSTIPATION:  You will want to have a regular, soft daily bowel movement. Upon discharge from the hospital you will be given prescriptions for a stool softener (Colace) and laxative (Lactulose or enulose) with instructions to take it on a daily basis. If you are feeling constipated or if it has been more than a day since your last bowel movement, it is acceptable to try the following:   Prunes/apple/raisins or other high-fiber foods  Metamucil or Citrucel: one heaping tablespoon in 8oz of water twice per day. Milk of magnesia, to be taken at bedtime following the directions on the label   Be sure to drink at least 6-8 glasses of fluids per day. If the above does not work take Dulcolax 10-30 mg each day, in addition to the above. This can take up to 12 hours to work. If no bowel movement, take Miralax 17gm in 8oz of fluid each day in addition to the above. This can take 2-4 days to produce results.   If you have problems

## 2023-11-20 NOTE — H&P
Date of Surgery Update:  Tom Ribera was seen, history and physical examination reviewed, and patient examined by me today. There have been no significant clinical changes since the completion of the previous history and physical. The surgical site was confirmed by the patient and me. I have presented reasonable alternatives to the patient's proposed care, treatment, and services. The discussion I have done encompassed risks, benefits, and side effects related to the alternatives and the risks related to not receiving the proposed care, treatment, and services. All questions answered. Patient wishes to proceed.      Electronically signed by: Karly Kingsley MD,11/20/2023,7:35 AM

## 2023-11-28 ENCOUNTER — OFFICE VISIT (OUTPATIENT)
Dept: UROGYNECOLOGY | Age: 43
End: 2023-11-28
Payer: COMMERCIAL

## 2023-11-28 VITALS
HEART RATE: 104 BPM | RESPIRATION RATE: 16 BRPM | SYSTOLIC BLOOD PRESSURE: 122 MMHG | OXYGEN SATURATION: 98 % | DIASTOLIC BLOOD PRESSURE: 77 MMHG | TEMPERATURE: 98.2 F

## 2023-11-28 DIAGNOSIS — R30.0 DYSURIA: ICD-10-CM

## 2023-11-28 DIAGNOSIS — Z09 POSTOP CHECK: Primary | ICD-10-CM

## 2023-11-28 DIAGNOSIS — R39.89 URETHRAL PAIN: ICD-10-CM

## 2023-11-28 LAB
BILIRUBIN, POC: NORMAL
BLOOD URINE, POC: NORMAL
CLARITY, POC: CLEAR
COLOR, POC: YELLOW
GLUCOSE URINE, POC: NORMAL
KETONES, POC: NORMAL
LEUKOCYTE EST, POC: NORMAL
NITRITE, POC: NORMAL
PH, POC: 5
PROTEIN, POC: NORMAL
SPECIFIC GRAVITY, POC: 1.01
UROBILINOGEN, POC: NORMAL

## 2023-11-28 PROCEDURE — 81002 URINALYSIS NONAUTO W/O SCOPE: CPT | Performed by: NURSE PRACTITIONER

## 2023-11-28 PROCEDURE — 99024 POSTOP FOLLOW-UP VISIT: CPT | Performed by: NURSE PRACTITIONER

## 2023-11-28 PROCEDURE — 51701 INSERT BLADDER CATHETER: CPT | Performed by: NURSE PRACTITIONER

## 2023-11-28 RX ORDER — PHENAZOPYRIDINE HYDROCHLORIDE 100 MG/1
100 TABLET, FILM COATED ORAL 3 TIMES DAILY PRN
Qty: 21 TABLET | Refills: 0 | Status: SHIPPED | OUTPATIENT
Start: 2023-11-28 | End: 2023-12-05

## 2023-12-03 LAB
BACTERIA UR CULT: ABNORMAL
ORGANISM: ABNORMAL

## 2023-12-04 ENCOUNTER — TELEPHONE (OUTPATIENT)
Dept: UROGYNECOLOGY | Age: 43
End: 2023-12-04

## 2023-12-04 DIAGNOSIS — N39.0 ACUTE UTI: Primary | ICD-10-CM

## 2023-12-04 RX ORDER — DOXYCYCLINE HYCLATE 100 MG
100 TABLET ORAL 2 TIMES DAILY
Qty: 10 TABLET | Refills: 0 | Status: SHIPPED | OUTPATIENT
Start: 2023-12-04 | End: 2023-12-09

## 2023-12-04 RX ORDER — NITROFURANTOIN 25; 75 MG/1; MG/1
100 CAPSULE ORAL 2 TIMES DAILY
Qty: 10 CAPSULE | Refills: 0 | Status: SHIPPED | OUTPATIENT
Start: 2023-12-04 | End: 2023-12-04

## 2023-12-04 NOTE — TELEPHONE ENCOUNTER
Left patient VM requesting her to call office back so we could schedule her 6 week post op visit. Provided patient with office phone number.

## 2023-12-12 ENCOUNTER — TELEPHONE (OUTPATIENT)
Dept: UROGYNECOLOGY | Age: 43
End: 2023-12-12

## 2023-12-12 DIAGNOSIS — R39.15 URINARY URGENCY: ICD-10-CM

## 2023-12-12 DIAGNOSIS — R39.15 URINARY URGENCY: Primary | ICD-10-CM

## 2023-12-12 DIAGNOSIS — R35.0 URINARY FREQUENCY: ICD-10-CM

## 2023-12-12 RX ORDER — CYCLOBENZAPRINE HCL 10 MG
10 TABLET ORAL 3 TIMES DAILY PRN
Qty: 21 TABLET | Refills: 0 | Status: SHIPPED | OUTPATIENT
Start: 2023-12-12 | End: 2023-12-22

## 2023-12-12 NOTE — TELEPHONE ENCOUNTER
Called patient back and informed her that per Rosa Maria Mendoza NP we have also sent Flexeril to her preferred pharmacy which she can take TID PRN. Informed patient what this medication is and how to take it. Patient also instructed to still complete the repeat urine culture as we discussed. She verbalized understanding to all of the above. Informed patient that Rosa Maria Mendoza NP would also like her to come in for a follow up next week with Dr. Octavia Rodriguez. Patient scheduled appointment for 12/20/2023 at 2:30.

## 2023-12-13 DIAGNOSIS — N35.919 URETHRAL SPASM: Primary | ICD-10-CM

## 2023-12-13 LAB — BACTERIA UR CULT: NORMAL

## 2023-12-13 RX ORDER — DIAZEPAM 10 MG/1
TABLET ORAL
Qty: 7 TABLET | Refills: 0 | Status: SHIPPED | OUTPATIENT
Start: 2023-12-13 | End: 2023-12-20

## 2023-12-13 NOTE — RESULT ENCOUNTER NOTE
Spoke with patient over the phone. Informed her that she does not have a UTI. Patient reports she is still in pain, the pyridium did not work even though she took it for a week, and the flexeril is not working either. After discussing with NP, and care plan being reviewed, vaginal valium will be sent tp her pharmacy. Patient should insert every night at bedtime until she sees Dr. Myers for further evaluation. Advised patient she should insert one tablet every night at bedtime after moistening it with a little bit of water first. Instructed to place under urethra not too far in. Patient verbalized understanding, no other questions at this time. Electronically signed by Kika Hays RN on 12/13/2023 at 4:08 PM

## 2023-12-15 ENCOUNTER — TELEPHONE (OUTPATIENT)
Dept: UROGYNECOLOGY | Age: 43
End: 2023-12-15

## 2023-12-15 NOTE — TELEPHONE ENCOUNTER
Spoke with patient over the phone this AM and inquired about how she was doing. Patient had negative urine culture and a trial of Flexeril. States Flexeril was not helpful- switched to vaginal Valium suppositories. Patient states she has not experienced incontinence since starting this and is hopeful it will continue to be helpful for her symptoms. Patient asked if she should continue Flexeril as well- advised patient if this is giving her no benefits and making her feel sluggish as she describes, she should not continue taking this. Patient confirmed follow up with Dr. Chevy Diego next week. She denies any further questions/concerns at this time. Patient very thankful for the call.

## 2023-12-26 RX ORDER — NITROFURANTOIN 25; 75 MG/1; MG/1
100 CAPSULE ORAL 2 TIMES DAILY
Qty: 14 CAPSULE | Refills: 0 | Status: SHIPPED | OUTPATIENT
Start: 2023-12-26 | End: 2024-01-02

## 2024-01-02 ENCOUNTER — OFFICE VISIT (OUTPATIENT)
Dept: UROGYNECOLOGY | Age: 44
End: 2024-01-02
Payer: COMMERCIAL

## 2024-01-02 VITALS
TEMPERATURE: 98.7 F | OXYGEN SATURATION: 96 % | DIASTOLIC BLOOD PRESSURE: 86 MMHG | RESPIRATION RATE: 16 BRPM | HEART RATE: 73 BPM | SYSTOLIC BLOOD PRESSURE: 128 MMHG

## 2024-01-02 DIAGNOSIS — R39.15 URINARY URGENCY: Primary | ICD-10-CM

## 2024-01-02 DIAGNOSIS — R35.0 URINARY FREQUENCY: ICD-10-CM

## 2024-01-02 LAB
BILIRUBIN, POC: NORMAL
BLOOD URINE, POC: NORMAL
CLARITY, POC: CLEAR
COLOR, POC: YELLOW
GLUCOSE URINE, POC: NORMAL
KETONES, POC: NORMAL
LEUKOCYTE EST, POC: NORMAL
NITRITE, POC: NORMAL
PH, POC: 6.5
PROTEIN, POC: NORMAL
SPECIFIC GRAVITY, POC: 1.02
UROBILINOGEN, POC: NORMAL

## 2024-01-02 PROCEDURE — 81002 URINALYSIS NONAUTO W/O SCOPE: CPT | Performed by: NURSE PRACTITIONER

## 2024-01-02 PROCEDURE — 99024 POSTOP FOLLOW-UP VISIT: CPT | Performed by: NURSE PRACTITIONER

## 2024-01-02 RX ORDER — NITROFURANTOIN MACROCRYSTALS 100 MG/1
CAPSULE ORAL
COMMUNITY
Start: 2023-12-24

## 2024-01-02 NOTE — PROGRESS NOTES
tobacco: Never   Vaping Use    Vaping Use: Never used   Substance and Sexual Activity    Alcohol use: Yes     Comment: very rarely    Drug use: Never    Sexual activity: Yes   Other Topics Concern    Not on file   Social History Narrative    Not on file     Social Determinants of Health     Financial Resource Strain: Not on file   Food Insecurity: Not on file   Transportation Needs: Not on file   Physical Activity: Not on file   Stress: Not on file   Social Connections: Not on file   Intimate Partner Violence: Not on file   Housing Stability: Not on file     Family History:   Family History   Problem Relation Age of Onset    Hypertension Mother     Cancer Mother     Heart Disease Mother          Objective:     Vitals  Vitals:    01/02/24 1035   BP: 128/86   Pulse: 73   Resp: 16   Temp: 98.7 °F (37.1 °C)   SpO2: 96%     Physical Exam  Physical Exam  All surgical incisions healing well. No erythema. No evidence of hematoma or abscess.    Results for POC orders placed in visit on 01/02/24   POCT Urinalysis no Micro   Result Value Ref Range    Color, UA yellow     Clarity, UA clear     Glucose, UA POC neg     Bilirubin, UA neg     Ketones, UA neg     Spec Grav, UA 1.020     Blood, UA POC pos, large     pH, UA 6.5     Protein, UA POC neg     Urobilinogen, UA neg     Leukocytes, UA pos, moderate     Nitrite, UA neg        Assessnent/Plan:     Olivia Marin is a 43 y.o. female with   1. Urinary urgency    2. Urinary frequency        Patient is  6 weeks.   No changes in her urinary frequency and leakage.  At this point we will continue with testing as scheduled  She is healed well, restrictions lifted  Urine sent for ANDREA  Tiffany Guillen, APRN - CNP      Orders Placed This Encounter   Procedures    Culture, Urine     Order Specific Question:   Specify (ex-cath, midstream, cysto, etc)?     Answer:   midstream    POCT Urinalysis no Micro     No orders of the defined types were placed in this encounter.      Tiffany Trent

## 2024-01-02 NOTE — PROGRESS NOTES
Narrative    Not on file     Social Determinants of Health     Financial Resource Strain: Not on file   Food Insecurity: Not on file   Transportation Needs: Not on file   Physical Activity: Not on file   Stress: Not on file   Social Connections: Not on file   Intimate Partner Violence: Not on file   Housing Stability: Not on file     Family History:   Family History   Problem Relation Age of Onset    Hypertension Mother     Cancer Mother     Heart Disease Mother          Objective:     Vitals  There were no vitals filed for this visit.  Physical Exam  Physical Exam  All surgical incisions healing well. No erythema. No evidence of hematoma or abscess.    No results found for this visit on 01/02/24.    Assessnent/Plan:     Olivia Marin is a 43 y.o. female with No diagnosis found.    Patient is doing well {time Urogyn:36409}. Patient is to follow up in {Follow Up Urogyn:53780}.     No orders of the defined types were placed in this encounter.    No orders of the defined types were placed in this encounter.      Kika Hays RN

## 2024-01-04 ENCOUNTER — TELEPHONE (OUTPATIENT)
Dept: UROGYNECOLOGY | Age: 44
End: 2024-01-04

## 2024-01-04 DIAGNOSIS — N39.0 ACUTE UTI: Primary | ICD-10-CM

## 2024-01-04 LAB
BACTERIA UR CULT: ABNORMAL
ORGANISM: ABNORMAL

## 2024-01-04 RX ORDER — CIPROFLOXACIN 500 MG/1
500 TABLET, FILM COATED ORAL 2 TIMES DAILY
Qty: 14 TABLET | Refills: 0 | Status: SHIPPED | OUTPATIENT
Start: 2024-01-04 | End: 2024-01-11

## 2024-01-04 NOTE — TELEPHONE ENCOUNTER
Called patient at request of HENRI Allen - given culture results patient is to start on Cipro twice daily for 7 days. Patient scheduled for Urodynamics testing a week from today, will plan to obtain ANDREA at this visit to verify UTI is gone. Patient verbalized understanding, no questions at this time.

## 2024-01-10 ENCOUNTER — TELEPHONE (OUTPATIENT)
Dept: UROGYNECOLOGY | Age: 44
End: 2024-01-10

## 2024-01-10 DIAGNOSIS — R35.0 URINARY FREQUENCY: Primary | ICD-10-CM

## 2024-01-10 DIAGNOSIS — R39.15 URINARY URGENCY: ICD-10-CM

## 2024-01-10 NOTE — TELEPHONE ENCOUNTER
Called patient back and informed her there is not much we can do until her urodynamic test has been done- which is scheduled for this Friday 1/12/2024.     Expressed to patient we could place an order for a repeat outpatient urine culture to ensure her UTI is cleared prior to her Urodynamic test on Friday. We would be unable to complete the Uro come Friday if the UTI is not cleared. Patient verbalized understanding. She denies any UTI symptoms at this time.     Order placed for urine culture. Patient denies any further questions at this time.

## 2024-01-10 NOTE — TELEPHONE ENCOUNTER
Patient called office this afternoon very upset and frustrated. Patient states she has been getting up 10+ times during the night to use the restroom, she is still having frequent incontinence episodes, and issues with UTIs.     She had a positive culture on 1/2/2024 in which was treated with Cipro BID for 7 days. She denies any UTI symptoms at this time. She states her quality life is very poor currently because of these urinary issues.     Patient requesting to have Urodynamic and Cystoscopy both done on Friday. Informed patient this is unfortunately not possible. She will only be able to have her urodynamic test completed that day. She verbalized understanding.     Patient requesting to speak to a provider directly. Informed patient someone would return her call later once patient's are done for the day.

## 2024-01-11 DIAGNOSIS — R39.15 URINARY URGENCY: ICD-10-CM

## 2024-01-11 DIAGNOSIS — R35.0 URINARY FREQUENCY: ICD-10-CM

## 2024-01-12 ENCOUNTER — PROCEDURE VISIT (OUTPATIENT)
Dept: UROGYNECOLOGY | Age: 44
End: 2024-01-12
Payer: COMMERCIAL

## 2024-01-12 VITALS
RESPIRATION RATE: 16 BRPM | HEART RATE: 80 BPM | TEMPERATURE: 97 F | DIASTOLIC BLOOD PRESSURE: 83 MMHG | SYSTOLIC BLOOD PRESSURE: 120 MMHG | OXYGEN SATURATION: 97 %

## 2024-01-12 DIAGNOSIS — R35.0 URINARY FREQUENCY: ICD-10-CM

## 2024-01-12 DIAGNOSIS — R39.15 URINARY URGENCY: Primary | ICD-10-CM

## 2024-01-12 LAB
BACTERIA UR CULT: NORMAL
BILIRUBIN, POC: NORMAL
BLOOD URINE, POC: NORMAL
CLARITY, POC: CLEAR
COLOR, POC: YELLOW
GLUCOSE URINE, POC: NORMAL
KETONES, POC: NORMAL
LEUKOCYTE EST, POC: NORMAL
NITRITE, POC: NORMAL
PH, POC: 6.5
PROTEIN, POC: NORMAL
SPECIFIC GRAVITY, POC: 1.01
UROBILINOGEN, POC: NORMAL

## 2024-01-12 PROCEDURE — 51784 ANAL/URINARY MUSCLE STUDY: CPT | Performed by: OBSTETRICS & GYNECOLOGY

## 2024-01-12 PROCEDURE — 51729 CYSTOMETROGRAM W/VP&UP: CPT | Performed by: OBSTETRICS & GYNECOLOGY

## 2024-01-12 PROCEDURE — 51741 ELECTRO-UROFLOWMETRY FIRST: CPT | Performed by: OBSTETRICS & GYNECOLOGY

## 2024-01-12 PROCEDURE — 51797 INTRAABDOMINAL PRESSURE TEST: CPT | Performed by: OBSTETRICS & GYNECOLOGY

## 2024-01-12 PROCEDURE — 81002 URINALYSIS NONAUTO W/O SCOPE: CPT | Performed by: OBSTETRICS & GYNECOLOGY

## 2024-01-12 NOTE — PROGRESS NOTES
Urodynamic Procedure Note    Olivia Marin  1980    Urodynamicist: Dr. Myers    Equipment: [employer]    Brief History/Indication:    Patient is a 43 y.o. female with subjective complaints of urge incontinence for a urodynamic evaluation. Patient reports she is on oxybutynin and it is not working for her. Patient dribbled a little bit of urine prior to catheter insertion.    Cystometrogram (second test)  WBC Urine, POC   Date Value Ref Range Status   12/20/2023 neg  Final     RBC Urine, POC   Date Value Ref Range Status   12/20/2023 neg  Final     Nitrate, UA POC   Date Value Ref Range Status   12/20/2023 neg  Final     post void residual   Date Value Ref Range Status   12/20/2023 25 ml Final     FIRST SENSATION   Date Value Ref Range Status   12/20/2023 25 cc Final     SECOND SENSATION   Date Value Ref Range Status   12/20/2023 60 cc Final     MAX SENSATION   Date Value Ref Range Status   12/20/2023 150 cc Final     EMPTY COUGH STRESS TEST   Date Value Ref Range Status   12/20/2023 neg  Final     FULL COUGH STRESS TEST   Date Value Ref Range Status   12/20/2023 neg  Final     SPASM   Date Value Ref Range Status   12/20/2023 neg  Final         Procedure:  The Patient was taken to the Urodynamics suite and a free urine flow was obtained followed by catheterization for residual urine. A double-lumen urodynamic catheter was introduced to the bladder for measuring bladder pressure, and for filling. EMG patch electrodes were placed perianally for recording activity of the anal sphincter. A vaginal catheter was inserted to record the abdominal pressure. The entire process was displayed and analyzed using the [employer] Urodynamic machine and software.    Findings:   Results for POC orders placed in visit on 01/12/24   POCT Urinalysis no Micro   Result Value Ref Range    Color, UA yellow     Clarity, UA clear     Glucose, UA POC neg     Bilirubin, UA neg     Ketones, UA neg     Spec Grav, UA 1.015     Blood, UA

## 2024-01-17 ENCOUNTER — PROCEDURE VISIT (OUTPATIENT)
Dept: UROGYNECOLOGY | Age: 44
End: 2024-01-17
Payer: COMMERCIAL

## 2024-01-17 VITALS
OXYGEN SATURATION: 97 % | HEART RATE: 84 BPM | TEMPERATURE: 98 F | DIASTOLIC BLOOD PRESSURE: 85 MMHG | SYSTOLIC BLOOD PRESSURE: 122 MMHG | RESPIRATION RATE: 16 BRPM

## 2024-01-17 DIAGNOSIS — R35.0 URINARY FREQUENCY: ICD-10-CM

## 2024-01-17 DIAGNOSIS — R39.15 URINARY URGENCY: Primary | ICD-10-CM

## 2024-01-17 DIAGNOSIS — N39.41 URGE INCONTINENCE: ICD-10-CM

## 2024-01-17 PROCEDURE — 52285 CYSTOSCOPY AND TREATMENT: CPT | Performed by: OBSTETRICS & GYNECOLOGY

## 2024-01-17 PROCEDURE — 81002 URINALYSIS NONAUTO W/O SCOPE: CPT | Performed by: OBSTETRICS & GYNECOLOGY

## 2024-01-17 PROCEDURE — 99214 OFFICE O/P EST MOD 30 MIN: CPT | Performed by: OBSTETRICS & GYNECOLOGY

## 2024-01-17 RX ORDER — OXYBUTYNIN CHLORIDE 10 MG/1
10 TABLET, EXTENDED RELEASE ORAL DAILY
Qty: 30 TABLET | Refills: 0 | OUTPATIENT
Start: 2024-01-17

## 2024-01-17 NOTE — PROGRESS NOTES
2024     HPI:     Name: Olivia Marin  YOB: 1980    CC: Olivia Marin is a 43 y.o. female presenting for an evaluation of  urinary frequency and incontinence . Patient here today for a repeat cystoscopy per Dr. Myers.         HPI:     Patient underwent Retropubic Sling and Cystoscopy with Dr. Myers on 2023 in a co-case with Dr. Amezquita and Dr. Amanda (Robotic Hysterectomy with Bilateral Salpingo-oophrectomy with Excision of Endometriosis, and Lysis of Adhesions).     Patient is currently taking Oxybutynin 10 mg- reports no improvement in her symptoms from this.     How long have you had this problem?  weeks  Please rate the severity of your problem: moderate  Anything make it better?       Ob/Gyn History:    OB History    Para Term  AB Living   2 2 2     2   SAB IAB Ectopic Molar Multiple Live Births             2      # Outcome Date GA Lbr Vaughn/2nd Weight Sex Delivery Anes PTL Lv   2 Term      Vag-Spont   CORNELL   1 Term      Vag-Spont   CORNELL      Birth Comments: System Generated. Please review and update pregnancy details.      Obstetric Comments   Has twins     Past Medical History:   Past Medical History:   Diagnosis Date    Depression     Endometriosis     GERD (gastroesophageal reflux disease)     History of adverse reaction to anesthesia     PONV (postoperative nausea and vomiting)     Prolonged emergence from general anesthesia      Past Surgical History:   Past Surgical History:   Procedure Laterality Date    ABDOMINOPLASTY      APPENDECTOMY      CYSTOSCOPY N/A 2023    CYSTOSCOPY performed by Misha Myers MD at [facility] OR    HERNIA REPAIR Bilateral     HYSTERECTOMY (CERVIX STATUS UNKNOWN) Bilateral 2023    ROBOTIC HYSTERECTOMY WITH BILATERAL SALPINGO-OOPHORECTOMY WITH EXCISION OF ENDOMETRIOSIS, LYSIS OF ADHESIONS performed by Velia Amanda MD at [facility] OR    LAPAROSCOPY      x2    RHINOPLASTY Bilateral     TONSILLECTOMY      URETHRAL

## 2024-01-19 DIAGNOSIS — F41.9 ANXIETY: Primary | ICD-10-CM

## 2024-01-19 RX ORDER — CIPROFLOXACIN 500 MG/1
500 TABLET, FILM COATED ORAL 2 TIMES DAILY
Qty: 14 TABLET | Refills: 0 | Status: SHIPPED | OUTPATIENT
Start: 2024-01-19 | End: 2024-01-26

## 2024-01-19 RX ORDER — DIAZEPAM 5 MG/1
5 TABLET ORAL ONCE
Qty: 1 TABLET | Refills: 0 | OUTPATIENT
Start: 2024-01-19 | End: 2024-01-19

## 2024-01-19 NOTE — PROGRESS NOTES
Pre meds for botox placed. Pt states she is very uncomfortable with taking macrobid d/t her sister reacting terribly with it - ordered Cipro and Valium.     Valium called in. 1/19/2024 at 2:25 PM

## 2024-01-24 ENCOUNTER — PROCEDURE VISIT (OUTPATIENT)
Dept: UROGYNECOLOGY | Age: 44
End: 2024-01-24
Payer: COMMERCIAL

## 2024-01-24 VITALS
DIASTOLIC BLOOD PRESSURE: 93 MMHG | SYSTOLIC BLOOD PRESSURE: 129 MMHG | TEMPERATURE: 98 F | HEART RATE: 95 BPM | RESPIRATION RATE: 16 BRPM | OXYGEN SATURATION: 98 %

## 2024-01-24 DIAGNOSIS — N32.89 BLADDER SPASM: ICD-10-CM

## 2024-01-24 DIAGNOSIS — R39.15 URINARY URGENCY: Primary | ICD-10-CM

## 2024-01-24 DIAGNOSIS — N39.41 URGE INCONTINENCE: ICD-10-CM

## 2024-01-24 DIAGNOSIS — R35.0 URINARY FREQUENCY: ICD-10-CM

## 2024-01-24 LAB
BILIRUBIN, POC: NORMAL
BLOOD URINE, POC: NORMAL
CLARITY, POC: CLEAR
COLOR, POC: YELLOW
GLUCOSE URINE, POC: NORMAL
KETONES, POC: NORMAL
LEUKOCYTE EST, POC: NORMAL
NITRITE, POC: NORMAL
PH, POC: 6.5
PROTEIN, POC: NORMAL
SPECIFIC GRAVITY, POC: 1.01
UROBILINOGEN, POC: NORMAL

## 2024-01-24 PROCEDURE — 81002 URINALYSIS NONAUTO W/O SCOPE: CPT | Performed by: OBSTETRICS & GYNECOLOGY

## 2024-01-24 PROCEDURE — 52287 CYSTOSCOPY CHEMODENERVATION: CPT | Performed by: OBSTETRICS & GYNECOLOGY

## 2024-01-24 RX ORDER — LIDOCAINE HYDROCHLORIDE 10 MG/ML
50 INJECTION, SOLUTION INFILTRATION; PERINEURAL ONCE
Status: COMPLETED | OUTPATIENT
Start: 2024-01-24 | End: 2024-01-24

## 2024-01-24 RX ORDER — DIAZEPAM 5 MG/1
TABLET ORAL
COMMUNITY
Start: 2024-01-19

## 2024-01-24 RX ADMIN — LIDOCAINE HYDROCHLORIDE 50 ML: 10 INJECTION, SOLUTION INFILTRATION; PERINEURAL at 12:30

## 2024-01-24 NOTE — PROGRESS NOTES
2024     HPI:     Name: Olivia Marin  YOB: 1980    CC: Olivia Marin is a 43 y.o. female presenting for an evaluation of urge incontinence .  HPI: How long have you had this problem?  months  Please rate the severity of your problem: moderate  Anything make it better? Per patient, she has been taking oxybutynin 10 mg daily.    Ob/Gyn History:    OB History    Para Term  AB Living   2 2 2     2   SAB IAB Ectopic Molar Multiple Live Births             2      # Outcome Date GA Lbr Vaughn/2nd Weight Sex Delivery Anes PTL Lv   2 Term      Vag-Spont   CORNELL   1 Term      Vag-Spont   CORNELL      Birth Comments: System Generated. Please review and update pregnancy details.      Obstetric Comments   Has twins     Past Medical History:   Past Medical History:   Diagnosis Date    Depression     Endometriosis     GERD (gastroesophageal reflux disease)     History of adverse reaction to anesthesia     PONV (postoperative nausea and vomiting)     Prolonged emergence from general anesthesia      Past Surgical History:   Past Surgical History:   Procedure Laterality Date    ABDOMINOPLASTY      APPENDECTOMY      CYSTOSCOPY N/A 2023    CYSTOSCOPY performed by Misha Myers MD at [facility] OR    HERNIA REPAIR Bilateral     HYSTERECTOMY (CERVIX STATUS UNKNOWN) Bilateral 2023    ROBOTIC HYSTERECTOMY WITH BILATERAL SALPINGO-OOPHORECTOMY WITH EXCISION OF ENDOMETRIOSIS, LYSIS OF ADHESIONS performed by Velia Amanda MD at [facility] OR    LAPAROSCOPY      x2    RHINOPLASTY Bilateral     TONSILLECTOMY      URETHRAL SURGERY N/A 2023    RETROPUBIC SLING performed by Misha Myers MD at [facility] OR     Current Medications:  Current Outpatient Medications   Medication Sig Dispense Refill    ciprofloxacin (CIPRO) 500 MG tablet Take 1 tablet by mouth 2 times daily for 7 days Start 3 days prior to procedure, then continue for 4 more days 14 tablet 0    nitrofurantoin (MACRODANTIN) 100 MG 
weeks for a post void residual.     Orders Placed This Encounter   Procedures    POCT Urinalysis no Micro     Orders Placed This Encounter   Medications    onabotulinumtoxinA (BOTOX) injection 100 Units    lidocaine 1 % injection 50 mL       JOSEFA GUZMAN MD

## 2024-01-26 ENCOUNTER — TELEPHONE (OUTPATIENT)
Dept: UROGYNECOLOGY | Age: 44
End: 2024-01-26

## 2024-01-26 NOTE — TELEPHONE ENCOUNTER
Spoke with [employer] representative for a peer to peer review of authorization request. Initial request for Botox was denied. Will make patient aware.     Peer to peer scheduled on 2/2/24 from 2-4 pm with RN    Electronically signed by Kika Hays RN on 1/26/2024 at 9:38 AM

## 2024-01-26 NOTE — TELEPHONE ENCOUNTER
Left vm for patient to call us back in regards to Botox injections and coverage updates. Electronically signed by Kika Hays RN on 1/26/2024 at 9:51 AM

## 2024-02-06 ENCOUNTER — OFFICE VISIT (OUTPATIENT)
Dept: UROGYNECOLOGY | Age: 44
End: 2024-02-06
Payer: COMMERCIAL

## 2024-02-06 VITALS
HEART RATE: 77 BPM | RESPIRATION RATE: 16 BRPM | SYSTOLIC BLOOD PRESSURE: 127 MMHG | TEMPERATURE: 98 F | OXYGEN SATURATION: 97 % | DIASTOLIC BLOOD PRESSURE: 94 MMHG

## 2024-02-06 DIAGNOSIS — N39.45 CONTINUOUS LEAKAGE OF URINE: Primary | ICD-10-CM

## 2024-02-06 DIAGNOSIS — N39.41 URGE INCONTINENCE: ICD-10-CM

## 2024-02-06 DIAGNOSIS — R35.0 URINARY FREQUENCY: ICD-10-CM

## 2024-02-06 PROCEDURE — 99212 OFFICE O/P EST SF 10 MIN: CPT | Performed by: NURSE PRACTITIONER

## 2024-02-06 PROCEDURE — 81002 URINALYSIS NONAUTO W/O SCOPE: CPT | Performed by: NURSE PRACTITIONER

## 2024-02-06 PROCEDURE — 51701 INSERT BLADDER CATHETER: CPT | Performed by: NURSE PRACTITIONER

## 2024-02-06 NOTE — PROGRESS NOTES
2024       HPI:     Name: Olivia Marin  YOB: 1980    CC: Patient is a 43 y.o. presenting for evaluation of  post void residual (PVR) . Patient had 100 units of Botox on 2024.      HPI: How long have you had this problem?    Please rate the severity of your problem: moderate  Anything make it better? Nothing      Olivia is frustrated and tearful today.  She presents for PVR 2weeks after 100 units of Botox and reports there is \"no change\" or \"possibly worsening\" incontinence  She no longer has a sensation of urge, she just continuously leaks    Ob/Gyn History:    OB History    Para Term  AB Living   2 2 2     2   SAB IAB Ectopic Molar Multiple Live Births             2      # Outcome Date GA Lbr Vaughn/2nd Weight Sex Delivery Anes PTL Lv   2 Term      Vag-Spont   CORNELL   1 Term      Vag-Spont   CORNELL      Birth Comments: System Generated. Please review and update pregnancy details.      Obstetric Comments   Has twins     Past Medical History:   Past Medical History:   Diagnosis Date    Depression     Endometriosis     GERD (gastroesophageal reflux disease)     History of adverse reaction to anesthesia     PONV (postoperative nausea and vomiting)     Prolonged emergence from general anesthesia      Past Surgical History:   Past Surgical History:   Procedure Laterality Date    ABDOMINOPLASTY      APPENDECTOMY      CYSTOSCOPY N/A 2023    CYSTOSCOPY performed by Misha Myers MD at [facility] OR    HERNIA REPAIR Bilateral     HYSTERECTOMY (CERVIX STATUS UNKNOWN) Bilateral 2023    ROBOTIC HYSTERECTOMY WITH BILATERAL SALPINGO-OOPHORECTOMY WITH EXCISION OF ENDOMETRIOSIS, LYSIS OF ADHESIONS performed by Velia Amanda MD at [facility] OR    LAPAROSCOPY      x2    RHINOPLASTY Bilateral     TONSILLECTOMY      URETHRAL SURGERY N/A 2023    RETROPUBIC SLING performed by Misha Myers MD at [facility] OR     Allergies:   Allergies   Allergen Reactions

## 2024-02-07 ENCOUNTER — OFFICE VISIT (OUTPATIENT)
Dept: UROGYNECOLOGY | Age: 44
End: 2024-02-07
Payer: COMMERCIAL

## 2024-02-07 ENCOUNTER — PREP FOR PROCEDURE (OUTPATIENT)
Dept: UROGYNECOLOGY | Age: 44
End: 2024-02-07

## 2024-02-07 VITALS
OXYGEN SATURATION: 97 % | DIASTOLIC BLOOD PRESSURE: 81 MMHG | HEART RATE: 92 BPM | SYSTOLIC BLOOD PRESSURE: 114 MMHG | RESPIRATION RATE: 18 BRPM | TEMPERATURE: 97.5 F

## 2024-02-07 DIAGNOSIS — R33.9 RETENTION OF URINE, UNSPECIFIED: ICD-10-CM

## 2024-02-07 DIAGNOSIS — N32.89 BLADDER SPASM: ICD-10-CM

## 2024-02-07 DIAGNOSIS — R35.0 URINARY FREQUENCY: Primary | ICD-10-CM

## 2024-02-07 DIAGNOSIS — N39.41 URGE INCONTINENCE: ICD-10-CM

## 2024-02-07 PROCEDURE — 99214 OFFICE O/P EST MOD 30 MIN: CPT | Performed by: OBSTETRICS & GYNECOLOGY

## 2024-02-07 RX ORDER — SODIUM CHLORIDE 9 MG/ML
INJECTION, SOLUTION INTRAVENOUS PRN
OUTPATIENT
Start: 2024-02-07

## 2024-02-07 RX ORDER — METRONIDAZOLE 500 MG/100ML
500 INJECTION, SOLUTION INTRAVENOUS
OUTPATIENT
Start: 2024-02-07 | End: 2024-02-07

## 2024-02-07 RX ORDER — SODIUM CHLORIDE, SODIUM LACTATE, POTASSIUM CHLORIDE, CALCIUM CHLORIDE 600; 310; 30; 20 MG/100ML; MG/100ML; MG/100ML; MG/100ML
INJECTION, SOLUTION INTRAVENOUS CONTINUOUS
OUTPATIENT
Start: 2024-02-07

## 2024-02-07 RX ORDER — SODIUM CHLORIDE 0.9 % (FLUSH) 0.9 %
5-40 SYRINGE (ML) INJECTION PRN
OUTPATIENT
Start: 2024-02-07

## 2024-02-07 RX ORDER — SODIUM CHLORIDE 0.9 % (FLUSH) 0.9 %
5-40 SYRINGE (ML) INJECTION EVERY 12 HOURS SCHEDULED
OUTPATIENT
Start: 2024-02-07

## 2024-02-07 NOTE — PROGRESS NOTES
2024       HPI:     Name: Olivia Marin  YOB: 1980    CC: Patient is a 43 y.o. presenting for evaluation of  urinary incontinence .       HPI:   Patient had a Retropubic Sling and Cystoscopy done on 2024. Since then she has experienced continuous incontinence.     Patient did receive 100 units of Botox on 2024. Two week PVR WNL (20 ml).     Patient presents today as a request to follow up with Dr. Myers about her concerns.    How long have you had this problem?  weeks  Please rate the severity of your problem: moderate  Anything make it better? Nothing     Ob/Gyn History:    OB History    Para Term  AB Living   2 2 2     2   SAB IAB Ectopic Molar Multiple Live Births             2      # Outcome Date GA Lbr Vaughn/2nd Weight Sex Delivery Anes PTL Lv   2 Term      Vag-Spont   CORNELL   1 Term      Vag-Spont   CORNELL      Birth Comments: System Generated. Please review and update pregnancy details.      Obstetric Comments   Has twins     Past Medical History:   Past Medical History:   Diagnosis Date    Depression     Endometriosis     GERD (gastroesophageal reflux disease)     History of adverse reaction to anesthesia     PONV (postoperative nausea and vomiting)     Prolonged emergence from general anesthesia      Past Surgical History:   Past Surgical History:   Procedure Laterality Date    ABDOMINOPLASTY      APPENDECTOMY      CYSTOSCOPY N/A 2023    CYSTOSCOPY performed by Misha Myers MD at [facility] OR    HERNIA REPAIR Bilateral     HYSTERECTOMY (CERVIX STATUS UNKNOWN) Bilateral 2023    ROBOTIC HYSTERECTOMY WITH BILATERAL SALPINGO-OOPHORECTOMY WITH EXCISION OF ENDOMETRIOSIS, LYSIS OF ADHESIONS performed by Velia Amanda MD at [facility] OR    LAPAROSCOPY      x2    RHINOPLASTY Bilateral     TONSILLECTOMY      URETHRAL SURGERY N/A 2023    RETROPUBIC SLING performed by Misha Myers MD at [facility] OR     Allergies:   Allergies   Allergen Reactions

## 2024-02-08 LAB
BACTERIA UR CULT: ABNORMAL
ORGANISM: ABNORMAL

## 2024-02-08 RX ORDER — NITROFURANTOIN 25; 75 MG/1; MG/1
100 CAPSULE ORAL 2 TIMES DAILY
Qty: 10 CAPSULE | Refills: 0 | Status: SHIPPED | OUTPATIENT
Start: 2024-02-08 | End: 2024-02-13

## 2024-02-08 NOTE — RESULT ENCOUNTER NOTE
Advised patient of positive culture. Pt aware Macrobid BID for 5 days was sent to preferred pharmacy. No questions or concerns at this time.

## 2024-02-09 ENCOUNTER — TELEPHONE (OUTPATIENT)
Dept: UROGYNECOLOGY | Age: 44
End: 2024-02-09

## 2024-02-09 ENCOUNTER — ANESTHESIA EVENT (OUTPATIENT)
Dept: OPERATING ROOM | Age: 44
End: 2024-02-09
Payer: COMMERCIAL

## 2024-02-09 NOTE — TELEPHONE ENCOUNTER
Surgery scheduled for 2/12/2024.    Called patient and allowed time for any additional questions prior to surgery.       Advised to stop all vitamins, herbal supplements, Aspirin, or NSAIDS the week prior to surgery.    Medications to hold morning of surgery: N/A    Confirmed date and time of surgery with patient as well as post op appointment.      Answered all questions patient had in regards to upcoming surgery - Asked patient to call office if there are any additional questions.

## 2024-02-10 NOTE — ANESTHESIA PRE PROCEDURE
Department of Anesthesiology  Preprocedure Note       Name:  Olivia Marin   Age:  43 y.o.  :  1980                                          MRN:  [id number]         Date:  2024      Surgeon: Surgeon(s):  Misha Myers MD    Procedure: Procedure(s):  URETHRAL SLING TAKEDOWN  CYSTOSCOPY    Medications prior to admission:   Prior to Admission medications    Medication Sig Start Date End Date Taking? Authorizing Provider   nitrofurantoin, macrocrystal-monohydrate, (MACROBID) 100 MG capsule Take 1 capsule by mouth 2 times daily for 5 days 24  Tiffany Guillen APRN - CNP   ibuprofen (ADVIL;MOTRIN) 800 MG tablet Take 1 tablet by mouth every 8 hours as needed for Pain 23   Velia Amanda MD   docusate sodium (COLACE) 100 MG capsule Take 1 capsule by mouth 2 times daily as needed for Constipation 23   Velia Amanda MD   ondansetron (ZOFRAN) 4 MG tablet Take 1 tablet by mouth every 6 hours as needed for Nausea or Vomiting 23   Velia Amanda MD   estradiol (VIVELLE-DOT) 0.0375 MG/24HR Place 1 patch onto the skin Twice a Week 23   Velia Amanda MD   FLUoxetine (PROZAC) 20 MG capsule 3 capsules daily 8/10/23   Richelle Amato MD   pantoprazole (PROTONIX) 40 MG tablet Take 1 tablet by mouth daily 8/10/23   ProviderRichelle MD       Current medications:    Current Facility-Administered Medications   Medication Dose Route Frequency Provider Last Rate Last Admin   • sodium chloride flush 0.9 % injection 5-40 mL  5-40 mL IntraVENous 2 times per day Santo Alvarez MD       • sodium chloride flush 0.9 % injection 5-40 mL  5-40 mL IntraVENous PRN Santo Alvarez MD       • 0.9 % sodium chloride infusion   IntraVENous PRN Santo Alvarez MD       • scopolamine (TRANSDERM-SCOP) transdermal patch 1 patch  1 patch TransDERmal Once Juan José Pardo MD       • aprepitant (EMEND) capsule 40 mg  40 mg Oral NOW Juan José Pardo MD       • lactated

## 2024-02-12 ENCOUNTER — ANESTHESIA (OUTPATIENT)
Dept: OPERATING ROOM | Age: 44
End: 2024-02-12
Payer: COMMERCIAL

## 2024-02-12 ENCOUNTER — HOSPITAL ENCOUNTER (OUTPATIENT)
Age: 44
Setting detail: OUTPATIENT SURGERY
Discharge: HOME | End: 2024-02-12
Attending: OBSTETRICS & GYNECOLOGY | Admitting: OBSTETRICS & GYNECOLOGY
Payer: COMMERCIAL

## 2024-02-12 VITALS
SYSTOLIC BLOOD PRESSURE: 117 MMHG | HEART RATE: 82 BPM | RESPIRATION RATE: 14 BRPM | OXYGEN SATURATION: 97 % | TEMPERATURE: 97.3 F | DIASTOLIC BLOOD PRESSURE: 86 MMHG | BODY MASS INDEX: 31.15 KG/M2 | HEIGHT: 70 IN | WEIGHT: 217.59 LBS

## 2024-02-12 DIAGNOSIS — R33.9 RETENTION OF URINE, UNSPECIFIED: ICD-10-CM

## 2024-02-12 PROCEDURE — 2500000003 HC RX 250 WO HCPCS: Performed by: NURSE ANESTHETIST, CERTIFIED REGISTERED

## 2024-02-12 PROCEDURE — 2580000003 HC RX 258: Performed by: ANESTHESIOLOGY

## 2024-02-12 PROCEDURE — 3600000014 HC SURGERY LEVEL 4 ADDTL 15MIN: Performed by: OBSTETRICS & GYNECOLOGY

## 2024-02-12 PROCEDURE — 6370000000 HC RX 637 (ALT 250 FOR IP): Performed by: STUDENT IN AN ORGANIZED HEALTH CARE EDUCATION/TRAINING PROGRAM

## 2024-02-12 PROCEDURE — 2500000003 HC RX 250 WO HCPCS: Performed by: OBSTETRICS & GYNECOLOGY

## 2024-02-12 PROCEDURE — 2580000003 HC RX 258: Performed by: OBSTETRICS & GYNECOLOGY

## 2024-02-12 PROCEDURE — 6360000002 HC RX W HCPCS: Performed by: STUDENT IN AN ORGANIZED HEALTH CARE EDUCATION/TRAINING PROGRAM

## 2024-02-12 PROCEDURE — 88300 SURGICAL PATH GROSS: CPT

## 2024-02-12 PROCEDURE — 6360000002 HC RX W HCPCS: Performed by: NURSE ANESTHETIST, CERTIFIED REGISTERED

## 2024-02-12 PROCEDURE — 2709999900 HC NON-CHARGEABLE SUPPLY: Performed by: OBSTETRICS & GYNECOLOGY

## 2024-02-12 PROCEDURE — 6360000002 HC RX W HCPCS: Performed by: OBSTETRICS & GYNECOLOGY

## 2024-02-12 PROCEDURE — 7100000001 HC PACU RECOVERY - ADDTL 15 MIN: Performed by: OBSTETRICS & GYNECOLOGY

## 2024-02-12 PROCEDURE — 3600000004 HC SURGERY LEVEL 4 BASE: Performed by: OBSTETRICS & GYNECOLOGY

## 2024-02-12 PROCEDURE — 7100000011 HC PHASE II RECOVERY - ADDTL 15 MIN: Performed by: OBSTETRICS & GYNECOLOGY

## 2024-02-12 PROCEDURE — 7100000000 HC PACU RECOVERY - FIRST 15 MIN: Performed by: OBSTETRICS & GYNECOLOGY

## 2024-02-12 PROCEDURE — 3700000001 HC ADD 15 MINUTES (ANESTHESIA): Performed by: OBSTETRICS & GYNECOLOGY

## 2024-02-12 PROCEDURE — 3700000000 HC ANESTHESIA ATTENDED CARE: Performed by: OBSTETRICS & GYNECOLOGY

## 2024-02-12 PROCEDURE — 7100000010 HC PHASE II RECOVERY - FIRST 15 MIN: Performed by: OBSTETRICS & GYNECOLOGY

## 2024-02-12 PROCEDURE — A4217 STERILE WATER/SALINE, 500 ML: HCPCS | Performed by: OBSTETRICS & GYNECOLOGY

## 2024-02-12 PROCEDURE — 2500000003 HC RX 250 WO HCPCS: Performed by: STUDENT IN AN ORGANIZED HEALTH CARE EDUCATION/TRAINING PROGRAM

## 2024-02-12 PROCEDURE — 57287 REVISE/REMOVE SLING REPAIR: CPT | Performed by: OBSTETRICS & GYNECOLOGY

## 2024-02-12 RX ORDER — APREPITANT 40 MG/1
40 CAPSULE ORAL
Status: COMPLETED | OUTPATIENT
Start: 2024-02-12 | End: 2024-02-12

## 2024-02-12 RX ORDER — DEXMEDETOMIDINE HYDROCHLORIDE 100 UG/ML
INJECTION, SOLUTION INTRAVENOUS PRN
Status: DISCONTINUED | OUTPATIENT
Start: 2024-02-12 | End: 2024-02-12 | Stop reason: SDUPTHER

## 2024-02-12 RX ORDER — SODIUM CHLORIDE 0.9 % (FLUSH) 0.9 %
5-40 SYRINGE (ML) INJECTION EVERY 12 HOURS SCHEDULED
Status: DISCONTINUED | OUTPATIENT
Start: 2024-02-12 | End: 2024-02-12 | Stop reason: SDUPTHER

## 2024-02-12 RX ORDER — SODIUM CHLORIDE 0.9 % (FLUSH) 0.9 %
5-40 SYRINGE (ML) INJECTION EVERY 12 HOURS SCHEDULED
Status: DISCONTINUED | OUTPATIENT
Start: 2024-02-12 | End: 2024-02-12 | Stop reason: HOSPADM

## 2024-02-12 RX ORDER — SODIUM CHLORIDE 9 MG/ML
INJECTION, SOLUTION INTRAVENOUS PRN
Status: DISCONTINUED | OUTPATIENT
Start: 2024-02-12 | End: 2024-02-12 | Stop reason: HOSPADM

## 2024-02-12 RX ORDER — ONDANSETRON 2 MG/ML
INJECTION INTRAMUSCULAR; INTRAVENOUS PRN
Status: DISCONTINUED | OUTPATIENT
Start: 2024-02-12 | End: 2024-02-12 | Stop reason: SDUPTHER

## 2024-02-12 RX ORDER — FENTANYL CITRATE 0.05 MG/ML
50 INJECTION, SOLUTION INTRAMUSCULAR; INTRAVENOUS EVERY 5 MIN PRN
Status: DISCONTINUED | OUTPATIENT
Start: 2024-02-12 | End: 2024-02-12 | Stop reason: HOSPADM

## 2024-02-12 RX ORDER — DEXAMETHASONE SODIUM PHOSPHATE 4 MG/ML
INJECTION, SOLUTION INTRA-ARTICULAR; INTRALESIONAL; INTRAMUSCULAR; INTRAVENOUS; SOFT TISSUE PRN
Status: DISCONTINUED | OUTPATIENT
Start: 2024-02-12 | End: 2024-02-12 | Stop reason: SDUPTHER

## 2024-02-12 RX ORDER — KETOROLAC TROMETHAMINE 30 MG/ML
INJECTION, SOLUTION INTRAMUSCULAR; INTRAVENOUS PRN
Status: DISCONTINUED | OUTPATIENT
Start: 2024-02-12 | End: 2024-02-12 | Stop reason: SDUPTHER

## 2024-02-12 RX ORDER — PROPOFOL 10 MG/ML
INJECTION, EMULSION INTRAVENOUS PRN
Status: DISCONTINUED | OUTPATIENT
Start: 2024-02-12 | End: 2024-02-12 | Stop reason: SDUPTHER

## 2024-02-12 RX ORDER — SODIUM CHLORIDE, SODIUM LACTATE, POTASSIUM CHLORIDE, CALCIUM CHLORIDE 600; 310; 30; 20 MG/100ML; MG/100ML; MG/100ML; MG/100ML
INJECTION, SOLUTION INTRAVENOUS CONTINUOUS
Status: DISCONTINUED | OUTPATIENT
Start: 2024-02-12 | End: 2024-02-12 | Stop reason: HOSPADM

## 2024-02-12 RX ORDER — MAGNESIUM HYDROXIDE 1200 MG/15ML
LIQUID ORAL CONTINUOUS PRN
Status: COMPLETED | OUTPATIENT
Start: 2024-02-12 | End: 2024-02-12

## 2024-02-12 RX ORDER — METRONIDAZOLE 500 MG/100ML
500 INJECTION, SOLUTION INTRAVENOUS
Status: COMPLETED | OUTPATIENT
Start: 2024-02-12 | End: 2024-02-12

## 2024-02-12 RX ORDER — LIDOCAINE HYDROCHLORIDE AND EPINEPHRINE 10; 10 MG/ML; UG/ML
INJECTION, SOLUTION INFILTRATION; PERINEURAL
Status: COMPLETED | OUTPATIENT
Start: 2024-02-12 | End: 2024-02-12

## 2024-02-12 RX ORDER — KETAMINE HCL IN NACL, ISO-OSM 100MG/10ML
SYRINGE (ML) INJECTION PRN
Status: DISCONTINUED | OUTPATIENT
Start: 2024-02-12 | End: 2024-02-12 | Stop reason: SDUPTHER

## 2024-02-12 RX ORDER — SODIUM CHLORIDE 0.9 % (FLUSH) 0.9 %
5-40 SYRINGE (ML) INJECTION PRN
Status: DISCONTINUED | OUTPATIENT
Start: 2024-02-12 | End: 2024-02-12 | Stop reason: HOSPADM

## 2024-02-12 RX ORDER — SCOLOPAMINE TRANSDERMAL SYSTEM 1 MG/1
1 PATCH, EXTENDED RELEASE TRANSDERMAL ONCE
Status: DISCONTINUED | OUTPATIENT
Start: 2024-02-12 | End: 2024-02-12 | Stop reason: HOSPADM

## 2024-02-12 RX ORDER — LIDOCAINE HYDROCHLORIDE 20 MG/ML
INJECTION, SOLUTION EPIDURAL; INFILTRATION; INTRACAUDAL; PERINEURAL PRN
Status: DISCONTINUED | OUTPATIENT
Start: 2024-02-12 | End: 2024-02-12 | Stop reason: SDUPTHER

## 2024-02-12 RX ORDER — FENTANYL CITRATE 0.05 MG/ML
25 INJECTION, SOLUTION INTRAMUSCULAR; INTRAVENOUS EVERY 5 MIN PRN
Status: DISCONTINUED | OUTPATIENT
Start: 2024-02-12 | End: 2024-02-12 | Stop reason: HOSPADM

## 2024-02-12 RX ORDER — SODIUM CHLORIDE 9 MG/ML
INJECTION, SOLUTION INTRAVENOUS PRN
Status: DISCONTINUED | OUTPATIENT
Start: 2024-02-12 | End: 2024-02-12 | Stop reason: SDUPTHER

## 2024-02-12 RX ORDER — LORAZEPAM 2 MG/ML
1 INJECTION INTRAMUSCULAR
Status: COMPLETED | OUTPATIENT
Start: 2024-02-12 | End: 2024-02-12

## 2024-02-12 RX ORDER — IPRATROPIUM BROMIDE AND ALBUTEROL SULFATE 2.5; .5 MG/3ML; MG/3ML
1 SOLUTION RESPIRATORY (INHALATION)
Status: DISCONTINUED | OUTPATIENT
Start: 2024-02-12 | End: 2024-02-12 | Stop reason: HOSPADM

## 2024-02-12 RX ORDER — ONDANSETRON 2 MG/ML
4 INJECTION INTRAMUSCULAR; INTRAVENOUS
Status: DISCONTINUED | OUTPATIENT
Start: 2024-02-12 | End: 2024-02-12 | Stop reason: HOSPADM

## 2024-02-12 RX ORDER — SODIUM CHLORIDE 0.9 % (FLUSH) 0.9 %
5-40 SYRINGE (ML) INJECTION PRN
Status: DISCONTINUED | OUTPATIENT
Start: 2024-02-12 | End: 2024-02-12 | Stop reason: SDUPTHER

## 2024-02-12 RX ORDER — FENTANYL CITRATE 50 UG/ML
INJECTION, SOLUTION INTRAMUSCULAR; INTRAVENOUS PRN
Status: DISCONTINUED | OUTPATIENT
Start: 2024-02-12 | End: 2024-02-12 | Stop reason: SDUPTHER

## 2024-02-12 RX ORDER — MIDAZOLAM HYDROCHLORIDE 1 MG/ML
INJECTION INTRAMUSCULAR; INTRAVENOUS PRN
Status: DISCONTINUED | OUTPATIENT
Start: 2024-02-12 | End: 2024-02-12 | Stop reason: SDUPTHER

## 2024-02-12 RX ORDER — LEVOFLOXACIN 5 MG/ML
500 INJECTION, SOLUTION INTRAVENOUS
Status: COMPLETED | OUTPATIENT
Start: 2024-02-12 | End: 2024-02-12

## 2024-02-12 RX ADMIN — SODIUM CHLORIDE: 9 INJECTION, SOLUTION INTRAVENOUS at 09:30

## 2024-02-12 RX ADMIN — PROPOFOL 200 MCG/KG/MIN: 10 INJECTION, EMULSION INTRAVENOUS at 10:25

## 2024-02-12 RX ADMIN — DEXAMETHASONE SODIUM PHOSPHATE 8 MG: 4 INJECTION, SOLUTION INTRAMUSCULAR; INTRAVENOUS at 10:29

## 2024-02-12 RX ADMIN — ONDANSETRON 4 MG: 2 INJECTION INTRAMUSCULAR; INTRAVENOUS at 10:36

## 2024-02-12 RX ADMIN — MIDAZOLAM 2 MG: 1 INJECTION INTRAMUSCULAR; INTRAVENOUS at 10:18

## 2024-02-12 RX ADMIN — LEVOFLOXACIN 500 MG: 5 INJECTION, SOLUTION INTRAVENOUS at 09:30

## 2024-02-12 RX ADMIN — LIDOCAINE HYDROCHLORIDE 60 MG: 20 INJECTION, SOLUTION EPIDURAL; INFILTRATION; INTRACAUDAL; PERINEURAL at 10:25

## 2024-02-12 RX ADMIN — METRONIDAZOLE 500 MG: 500 INJECTION, SOLUTION INTRAVENOUS at 10:33

## 2024-02-12 RX ADMIN — KETOROLAC TROMETHAMINE 30 MG: 30 INJECTION, SOLUTION INTRAMUSCULAR at 10:51

## 2024-02-12 RX ADMIN — DEXMEDETOMIDINE HYDROCHLORIDE 10 MCG: 100 INJECTION, SOLUTION INTRAVENOUS at 11:17

## 2024-02-12 RX ADMIN — FENTANYL CITRATE 50 MCG: 50 INJECTION INTRAMUSCULAR; INTRAVENOUS at 10:25

## 2024-02-12 RX ADMIN — Medication 10 MG: at 10:28

## 2024-02-12 RX ADMIN — SODIUM CHLORIDE: 9 INJECTION, SOLUTION INTRAVENOUS at 10:19

## 2024-02-12 RX ADMIN — Medication 1 MG: at 11:15

## 2024-02-12 RX ADMIN — APREPITANT 40 MG: 40 CAPSULE ORAL at 09:31

## 2024-02-12 RX ADMIN — PROPOFOL 50 MG: 10 INJECTION, EMULSION INTRAVENOUS at 10:29

## 2024-02-12 ASSESSMENT — PAIN SCALES - GENERAL
PAINLEVEL_OUTOF10: 0

## 2024-02-12 ASSESSMENT — PAIN - FUNCTIONAL ASSESSMENT
PAIN_FUNCTIONAL_ASSESSMENT: NONE - DENIES PAIN
PAIN_FUNCTIONAL_ASSESSMENT: NONE - DENIES PAIN
PAIN_FUNCTIONAL_ASSESSMENT: 0-10

## 2024-02-12 NOTE — BRIEF OP NOTE
Brief Postoperative Note      Patient: Olivia Marin  YOB: 1980  MRN: [id number]    Date of Procedure: 2/12/2024    Pre-Op Diagnosis Codes:     * Retention of urine, unspecified [R33.9]    Post-Op Diagnosis: Same       Procedure(s):  URETHRAL SLING TAKEDOWN    Surgeon(s):  Misha Myers MD    Assistant:  Surgical Assistant: Idalmis Prince; Jason Renee    Anesthesia: General    Estimated Blood Loss (mL): Minimal    Complications: None    Specimens:   ID Type Source Tests Collected by Time Destination   A : A) urethral sling mesh - GROSS ONLY Tissue Tissue SURGICAL PATHOLOGY Misha Myers MD 2/12/2024 1040        Implants:  * No implants in log *      Drains: * No LDAs found *    Findings: approximately 2.5 cm of sling was removed from under the mid-urethra      Electronically signed by MISHA MYERS MD on 2/12/2024 at 10:47 AM

## 2024-02-12 NOTE — PROGRESS NOTES
Vaginal Sweep Documentation     Vaginal prep sponge count performed by FAROOQ MCCOY RN and SA INA Count correct.   Vaginal sweep performed by DR. GUZMAN  at 1045. No foreign objects or vaginal tears noted.    
    [facility] Pre-Admission Testing Electronic Communication Worksheet for OR/ENDO Procedures        Patient: Olivia Marin    DOS: 2/12    Arrival Time: 0855    Surgery Time:1055    Meds to Bed:  [] YES    [x]  NO    Transportation Confirmed: [x] YES    []  NO    History and Physical:  [x] YES    []  NO  [] N/A  If yes, please list doctor or Urgent Care and date of H&P: Per Dr Louis Yates in his office    Additional Clearance(Cardiac, Pulmonary, etc):  [] YES    []  NO    Pre-Admission Testing Visit:  [] YES    []  NO If no, do labs/testing need to be done DOS?  [] YES    []  NO    Medication Reconciliation Complete:  [x] YES    []  NO        Additional Notes:                Interview Complete: [x] YES    []  NO          Alyssa Baca, RN  1:29 PM  
Pt is calm now, Pt  at beside. Vital signs stable on room air and Pt denies pain at this time. Will transfer Pt to phase 2   
Pt taken to car via wheelchair, being driven home by , no signs of distress noted at time of transfer to car.  
Pt to PACU from OR, Pt is very tearful, states she feels like she is incontinent but thought that the surgery would fix this. Dr. Myers notified, states it can take up to 2 weeks for there to be results. Dr. Pardo notified of Pt tearful and Pt medicated per MAR.   
Pt to phase 2 from pacu. Pt denies pain or nausea at this time,  at bedside. Given po snack. Pt frustrated and anxious, states was told incontinence was going to be resolved immediately, states is having incontinence currently. Updated on plan of care, given po snack. Vss. Clothes at bedside per . Call placed to Dr nur for clarification, states improvement can take up to 2 weeks but would expect some improvement after a few days, patient aware.  
Pt tolerating po snack, states still no pain or nausea and would like to leave. Discharge discussed with patient and , verbalized understanding. Vss. Pt ambulated to restroom with , gait steady. Pt getting dressed.  
surgery.    C-Difficile admission screening and protocol:       * Admitted with diarrhea?                         [] YES    [x]  NO     *Prior history of C-Diff. In last 3 months? [] YES    [x]  NO     *Antibiotic use in the past 6-8 weeks?      []  NO    [x]  YES                 If yes, which ANTIBIOTIC AND REASON___UTI___     *Prior hospitalization or nursing home in the last month? []  YES    [x]  NO        SAFETY FIRST..call before you fall

## 2024-02-12 NOTE — ANESTHESIA POSTPROCEDURE EVALUATION
Department of Anesthesiology  Postprocedure Note    Patient: Olivia Marin  MRN: [id number]  YOB: 1980  Date of evaluation: 2/12/2024    Procedure Summary     Date: 02/12/24 Room / Location: [address]    Anesthesia Start: 1019 Anesthesia Stop: 1100    Procedure: URETHRAL SLING TAKEDOWN (Vagina ) Diagnosis:       Retention of urine, unspecified      (Retention of urine, unspecified [R33.9])    Surgeons: Misha Myers MD Responsible Provider: Juan José Pardo MD    Anesthesia Type: MAC ASA Status: 2          Anesthesia Type: MAC    Kaylene Phase I: Kaylene Score: 10    Kaylene Phase II:      Anesthesia Post Evaluation    Level of consciousness: agitated, anxious and awake and alert  Airway patency: patent  Nausea & Vomiting: no nausea and no vomiting  Cardiovascular status: hypertensive and hemodynamically stable  Respiratory status: room air, spontaneous ventilation and nonlabored ventilation  Hydration status: stable  Comments: Ms. Marin was emotional, tearful following procedure 2/2 continued incontinence, which she thought would be completely resolved by procedure. Dr. Myers and spouse at bedside. Additional ativan and small bolus of precedex given, which seemed to immediately improve anxiety. Reports remembering voices during procedure. (Patient was stable with oral airway in place throughout.) Cannot excluded that small bolus of ketamine may be contributory, but thought content lucid, patient expresses severe frustration with chronic incontinence and associated effect on daily living. Anticipate return to [facility] for planned discharge home with .   Pain management: adequate      No notable events documented.

## 2024-02-12 NOTE — H&P
Date of Surgery Update:  Olivia Marin was seen, history and physical examination reviewed, and patient examined by me today. There have been no significant clinical changes since the completion of the previous history and physical. The surgical site was confirmed by the patient and me.     I have presented reasonable alternatives to the patient's proposed care, treatment, and services. The discussion I have done encompassed risks, benefits, and side effects related to the alternatives and the risks related to not receiving the proposed care, treatment, and services.     All questions answered. Patient wishes to proceed.     Electronically signed by: JOSEFA GUZMAN MD,2/12/2024,9:30 AM

## 2024-02-12 NOTE — DISCHARGE INSTRUCTIONS
[facility]  [address]  [place], [place] [address] [phone]    Dr. Misha Myers MD  [address]  [address]  [place]  [address]  Phone [phone]  Fax: [phone]      PATIENT NAME: Olivia Marin  MEDICAL RECORD NUMBER:  [id number]  TODAY'S DATE: 2/12/2024       Discharge Instructions Minor: Procedure(s):  URETHRAL SLING TAKEDOWN: 09227 (CPT®)      Post-operative instructions  WHAT TO EXPECT AFTER THE PROCEDURE:   Diet You may return to your normal diet after surgery. Mild nausea and possibly vomiting may occur in the first 6-8 hours following surgery. This is usually due to side effects of anesthesia and will resolve. We suggest clear liquids and a light meal the evening following surgery.     Activity You will be limited to light activity for 2 weeks. You may not engage in sexual intercourse, use tampons, lift  greater than 10 lbs., jump, squat, or ride straddle (bike, motorcycle, etc.) for 4-6 weeks.    Voiding You may notice some mild burning with urination after surgery due to the catheter that is placed during surgery. This should resolve in 1-2 days. You also may notice a slower stream or mild difficulty voiding. This can be secondary to the swelling and usually improves within a week. If at any point you cannot void for 6 straight hours, contact our office.  You may come home with a urinary catheter (should not be attached to a bag) you will come into the office 1 week later to attempt a voiding trial and see if its ready to be removed.     Please contact our office if you have any of the following symptoms:  Discharge that has a foul odor or is green in color.   Soaking a pad every 2 hours, continuing to bleed longer than 1 week or have a sudden increase in your bleeding.  Develop a fever of 100.4 or higher.     Hygiene You may resume normal showering immediately after surgery. Avoid baths and swimming for 4 weeks after surgery.  No lotions or

## 2024-02-14 NOTE — OP NOTE
incontinence.    OPERATIVE NOTE:   The patient was taken to the operating room and general anesthesia was found to be adequate.  She was then prepped and draped in normal sterile fashion in a dorsal supine lithotomy position.  A Rivero catheter was placed and the area under the mid urethra was injected with 1% lidocaine with epinephrine.  A midline incision was then made and the sling was easily identified.  After it was identified it was cut in the middle and then using Metzenbaum scissors the surrounding tissue was gently dissected off of the sling laterally to the inferior ramus of the pubis.  The sling was then cut on both sides.  The sling was sent off to pathology.  The incision was then repaired with a 3-0 Vicryl suture in a running unlocked fashion.  The patient tolerated the procedure well and was taken to the recovery room in stable condition.        Electronically signed by JOSEFA GUZMAN MD on 2/14/2024 at 12:38 PM

## 2024-02-15 ENCOUNTER — TELEPHONE (OUTPATIENT)
Dept: UROGYNECOLOGY | Age: 44
End: 2024-02-15

## 2024-02-15 NOTE — TELEPHONE ENCOUNTER
Spoke with patient over the phone. She reports she is still having urinary leakage and has not improved. Will notify provider for next steps. Electronically signed by Kika Hays RN on 2/15/2024 at 11:57 AM

## 2024-02-16 ENCOUNTER — TELEPHONE (OUTPATIENT)
Dept: UROGYNECOLOGY | Age: 44
End: 2024-02-16

## 2024-02-16 DIAGNOSIS — N39.0 ACUTE UTI: ICD-10-CM

## 2024-02-16 DIAGNOSIS — N39.45 CONTINUOUS LEAKAGE OF URINE: Primary | ICD-10-CM

## 2024-02-16 DIAGNOSIS — N39.0 ACUTE UTI: Primary | ICD-10-CM

## 2024-02-16 NOTE — TELEPHONE ENCOUNTER
Spoke with patient over the phone. Informed her that we have been discussing her care with provider as well as working in clinic. He would like to order a CT urogram to rule out a fistula. Informed her that a fistula is a communication that could have formed between the urinary tract and the vagina. We have been giving her some time to heal from the sling takedown before ordering the test. Patient verbalized understanding and is thankful. Advised her that Dr. Myers will call her to discuss further in regard to what a fistula is. Provided central scheduling phone number so that she may get the imaging done as soon as possible. She is still leaking continuously leaking and she dropped off a urine specimen today. .Electronically signed by Kika Hays RN on 2/16/2024 at 2:55 PM

## 2024-02-16 NOTE — TELEPHONE ENCOUNTER
Replied to patient via [email or web address] message. Will contact patient with next steps after discussion with provider.

## 2024-02-18 LAB — BACTERIA UR CULT: NORMAL

## 2024-02-20 ENCOUNTER — TELEPHONE (OUTPATIENT)
Dept: UROGYNECOLOGY | Age: 44
End: 2024-02-20

## 2024-02-20 NOTE — TELEPHONE ENCOUNTER
I called also to see how she was doing and also to talk about next steps.  She was very upset when I spoke to her and felt like she was being treated as a \"bother\" or a second class patient.  I spent some time talking with her today about this but also about the need for a CT urogram.

## 2024-02-21 NOTE — RESULT ENCOUNTER NOTE
Patient made aware of negative urine culture result. Patient verbalizes understanding of all of the above, and has no further questions or concerns at this time. Encouraged to call back the office should any questions/concerns arise. 2/21/2024 at 8:49 AM.

## 2024-02-28 ENCOUNTER — OFFICE VISIT (OUTPATIENT)
Dept: UROGYNECOLOGY | Age: 44
End: 2024-02-28

## 2024-02-28 ENCOUNTER — TELEPHONE (OUTPATIENT)
Dept: UROGYNECOLOGY | Age: 44
End: 2024-02-28

## 2024-02-28 VITALS
HEART RATE: 92 BPM | SYSTOLIC BLOOD PRESSURE: 108 MMHG | TEMPERATURE: 97.7 F | DIASTOLIC BLOOD PRESSURE: 80 MMHG | OXYGEN SATURATION: 97 % | RESPIRATION RATE: 16 BRPM

## 2024-02-28 DIAGNOSIS — Z09 POSTOP CHECK: Primary | ICD-10-CM

## 2024-02-28 PROCEDURE — 99024 POSTOP FOLLOW-UP VISIT: CPT | Performed by: OBSTETRICS & GYNECOLOGY

## 2024-02-28 NOTE — TELEPHONE ENCOUNTER
Spoke with central scheduling in regards to CT urogram approval. They have not worked on this case yet for approval, representative is working on approvals for today. If provider would like CT urogram stat, then patient would be able to get in sooner and pre cert would be done afterwards. Will make provider aware. Electronically signed by Kika Hays RN on 2/28/2024 at 12:23 PM

## 2024-02-28 NOTE — PROGRESS NOTES
for this visit.     Allergies:   Allergies   Allergen Reactions    Oxycodone-Acetaminophen Nausea And Vomiting     Headache for 3 days    Amoxicillin Nausea And Vomiting    Augmentin [Amoxicillin-Pot Clavulanate] Nausea And Vomiting    Sulfa Antibiotics Nausea And Vomiting     Social History:   Social History     Socioeconomic History    Marital status:      Spouse name: Not on file    Number of children: Not on file    Years of education: Not on file    Highest education level: Not on file   Occupational History    Not on file   Tobacco Use    Smoking status: Former     Current packs/day: 0.00     Average packs/day: 1 pack/day for 15.0 years (15.0 ttl pk-yrs)     Types: Cigarettes     Start date:      Quit date:      Years since quittin.1    Smokeless tobacco: Never   Vaping Use    Vaping Use: Never used   Substance and Sexual Activity    Alcohol use: Yes     Comment: very rarely    Drug use: Never    Sexual activity: Yes   Other Topics Concern    Not on file   Social History Narrative    Not on file     Social Determinants of Health     Financial Resource Strain: Not on file   Food Insecurity: Not on file   Transportation Needs: Not on file   Physical Activity: Not on file   Stress: Not on file   Social Connections: Not on file   Intimate Partner Violence: Not on file   Housing Stability: Not on file     Family History:   Family History   Problem Relation Age of Onset    Hypertension Mother     Cancer Mother     Heart Disease Mother          Objective:     Vitals  Vitals:    24 1326   BP: 108/80   Pulse: 92   Resp: 16   Temp: 97.7 °F (36.5 °C)   SpO2: 97%     Physical Exam  Physical Exam  All surgical incisions healing well.  Looking in the vagina with a full speculum there is definitely pooling of urine at the back of the vagina suggestive of a possible fistula.  No results found for this visit on 24.    Assessnent/Plan:     Olivia Marin is a 43 y.o. female with   1. Postop

## 2024-03-04 ENCOUNTER — HOSPITAL ENCOUNTER (OUTPATIENT)
Dept: CT IMAGING | Age: 44
Discharge: HOME | End: 2024-03-04
Attending: OBSTETRICS & GYNECOLOGY
Payer: COMMERCIAL

## 2024-03-04 DIAGNOSIS — N39.45 CONTINUOUS LEAKAGE OF URINE: ICD-10-CM

## 2024-03-04 PROCEDURE — 74178 CT ABD&PLV WO CNTR FLWD CNTR: CPT | Performed by: OBSTETRICS & GYNECOLOGY

## 2024-03-04 PROCEDURE — 6360000004 HC RX CONTRAST MEDICATION: Performed by: OBSTETRICS & GYNECOLOGY

## 2024-03-04 RX ADMIN — IOPAMIDOL 75 ML: 755 INJECTION, SOLUTION INTRAVENOUS at 11:05

## 2024-03-05 ENCOUNTER — NURSE ONLY (OUTPATIENT)
Dept: UROGYNECOLOGY | Age: 44
End: 2024-03-05
Payer: COMMERCIAL

## 2024-03-05 VITALS
SYSTOLIC BLOOD PRESSURE: 136 MMHG | HEART RATE: 90 BPM | DIASTOLIC BLOOD PRESSURE: 84 MMHG | TEMPERATURE: 98 F | OXYGEN SATURATION: 97 % | RESPIRATION RATE: 18 BRPM

## 2024-03-05 DIAGNOSIS — K80.20 GALLSTONES: Primary | ICD-10-CM

## 2024-03-05 DIAGNOSIS — R35.0 URINARY FREQUENCY: Primary | ICD-10-CM

## 2024-03-05 PROCEDURE — 99212 OFFICE O/P EST SF 10 MIN: CPT | Performed by: NURSE PRACTITIONER

## 2024-03-05 NOTE — PROGRESS NOTES
Rivero catheter with bag placed per sterile technique. 50 mls clear urine drained from bag. Pt continues to leak around catheter, and is frustrated, saying \"I thought I was going to get to sleep in my bed tonight\".    RN Manager spoke with Dr. Myers and reviewed with patient treatment expectations. There is a potential for the fistula to heal if the catheter is in place. The effects will not be immediate. Dr. Myers will discuss pt care with Dr. Galvin and place referral soon.    Per Dr. Myers, referral for Dr. Low placed for gallstones seen on CT scan.    Patient verbalizes understanding of all of the above, and has no further questions or concerns at this time. Encouraged to call back the office should any questions/concerns arise. 3/5/2024 at 12:54 PM.

## 2024-03-06 ENCOUNTER — TELEPHONE (OUTPATIENT)
Dept: UROGYNECOLOGY | Age: 44
End: 2024-03-06

## 2024-03-06 NOTE — TELEPHONE ENCOUNTER
Received call from [employer] prior [facility] department stating patient's CT Urogram was denied coverage. They state a peer to peer review will have to be scheduled with Dr. Myers and the [facility] insurance company for reconsideration.     Authorization number # [id number]    Will inform provider once back in the office.

## 2024-03-08 LAB
BACTERIA UR CULT: ABNORMAL
ORGANISM: ABNORMAL

## 2024-03-11 DIAGNOSIS — N39.0 ACUTE UTI: Primary | ICD-10-CM

## 2024-03-11 RX ORDER — NITROFURANTOIN 25; 75 MG/1; MG/1
100 CAPSULE ORAL 2 TIMES DAILY
Qty: 14 CAPSULE | Refills: 0 | Status: SHIPPED | OUTPATIENT
Start: 2024-03-11 | End: 2024-03-11 | Stop reason: ALTCHOICE

## 2024-04-15 NOTE — PROGRESS NOTES
[facility] PRE-OPERATIVE INSTRUCTIONS    Day of Procedure:       4/29         Arrival time:   1100             Surgery time:1230    Take the following medications with a sip of water:  Follow your MD/Surgeons pre-procedure instructions regarding your medications     Do not eat or drink anything after 12:00 midnight prior to your surgery.  This includes water chewing gum, mints and ice chips.   You may brush your teeth and gargle the morning of your surgery, but do not swallow the water     Please see your family doctor/pediatrician for a history and physical and/or concerning medications.   Bring any test results/reports from your physicians office.   If you are under the care of a heart doctor or specialist doctor, please be aware that you may be asked to them for clearance    You may be asked to stop blood thinners such as Coumadin, Plavix, Fragmin, Lovenox, etc., or any anti-inflammatories such as:  Aspirin, Ibuprofen, Advil, Naproxen prior to your surgery.    We also ask that you stop any OTC medications such as fish oil, vitamin E, glucosamine, garlic, Multivitamins, COQ 10, etc.    We ask that you do not smoke 24 hours prior to surgery  We ask that you do not  drink any alcoholic beverages 24 hours prior to surgery     You must make arrangements for a responsible adult to take you home after your surgery.    For your safety you will not be allowed to leave alone or drive yourself home.  Your surgery will be cancelled if you do not have a ride home.     Also for your safety, it is strongly suggested that someone stay with you the first 24 hours after your surgery.     A parent or legal guardian must accompany a child scheduled for surgery and plan to stay at the hospital until the child is discharged.    Please do not bring other children with you.    For your comfort, please wear simple loose fitting clothing to the hospital.  Please do not bring valuables.    Do not wear any make-up or nail

## 2024-04-15 NOTE — PROGRESS NOTES
Follow Up Prior to Surgery    DOS:   :1980      History and Physical:Called Dr Juarez`s office [phone] [facility] , spoke with Iesha await fax of H&P, labs and EKG CareEverywhere unable to update , await fax. Called again 5 hours later spoke with Maryann baca states waiting on MD to sign note. Will fax upon completion.            UPDATE:All in MEDIA except lab results which are viewable in Care Everywhere

## 2024-04-15 NOTE — PROGRESS NOTES
[facility] Pre-Admission Testing Electronic Communication Worksheet for OR/ENDO Procedures        Patient: Olivia Marin    Transportation Confirmed: [x] YES    []  NO    History and Physical:  [x] YES    []  NO  [] N/A  If yes, please list doctor or Urgent Care and date of H&P: Dr Juarez 4/9 [facility] await fax ,CareEverywhere not updating.    Additional Clearance(Cardiac, Pulmonary, etc):  [] YES    []  NO    Pre-Admission Testing Visit:  [] YES    []  NO If no, do labs/testing need to be done DOS?  [x] YES    []  NO    Medication Reconciliation Complete:  [x] YES    []  NO        Additional Notes:                Interview Complete: [x] YES    []  NO          Alyssa Baca, RN  10:40 AM

## 2024-04-26 ENCOUNTER — ANESTHESIA EVENT (OUTPATIENT)
Dept: OPERATING ROOM | Age: 44
End: 2024-04-26
Payer: COMMERCIAL

## 2024-04-29 ENCOUNTER — ANESTHESIA (OUTPATIENT)
Dept: OPERATING ROOM | Age: 44
End: 2024-04-29
Payer: COMMERCIAL

## 2024-04-29 ENCOUNTER — HOSPITAL ENCOUNTER (OUTPATIENT)
Age: 44
Discharge: HOME | End: 2024-04-30
Attending: UROLOGY | Admitting: UROLOGY
Payer: COMMERCIAL

## 2024-04-29 DIAGNOSIS — N82.0 VESICOVAGINAL FISTULA: Primary | ICD-10-CM

## 2024-04-29 LAB
ABO + RH BLD: NORMAL
APTT BLD: 26.8 SEC (ref 22.1–36.4)
BLD GP AB SCN SERPL QL: NORMAL
INR PPP: 0.9 (ref 0.85–1.15)
PROTHROMBIN TIME: 12.4 SEC (ref 11.9–14.9)

## 2024-04-29 PROCEDURE — 6360000002 HC RX W HCPCS: Performed by: UROLOGY

## 2024-04-29 PROCEDURE — 2580000003 HC RX 258: Performed by: UROLOGY

## 2024-04-29 PROCEDURE — C1762 CONN TISS, HUMAN(INC FASCIA): HCPCS | Performed by: UROLOGY

## 2024-04-29 PROCEDURE — C2617 STENT, NON-COR, TEM W/O DEL: HCPCS | Performed by: UROLOGY

## 2024-04-29 PROCEDURE — 6360000002 HC RX W HCPCS

## 2024-04-29 PROCEDURE — 7100000000 HC PACU RECOVERY - FIRST 15 MIN: Performed by: UROLOGY

## 2024-04-29 PROCEDURE — 6370000000 HC RX 637 (ALT 250 FOR IP): Performed by: UROLOGY

## 2024-04-29 PROCEDURE — 6370000000 HC RX 637 (ALT 250 FOR IP): Performed by: ANESTHESIOLOGY

## 2024-04-29 PROCEDURE — 2500000003 HC RX 250 WO HCPCS: Performed by: UROLOGY

## 2024-04-29 PROCEDURE — 3600000019 HC SURGERY ROBOT ADDTL 15MIN: Performed by: UROLOGY

## 2024-04-29 PROCEDURE — 2580000003 HC RX 258: Performed by: ANESTHESIOLOGY

## 2024-04-29 PROCEDURE — A4217 STERILE WATER/SALINE, 500 ML: HCPCS | Performed by: UROLOGY

## 2024-04-29 PROCEDURE — 86900 BLOOD TYPING SEROLOGIC ABO: CPT

## 2024-04-29 PROCEDURE — 3700000001 HC ADD 15 MINUTES (ANESTHESIA): Performed by: UROLOGY

## 2024-04-29 PROCEDURE — 85610 PROTHROMBIN TIME: CPT

## 2024-04-29 PROCEDURE — 6360000002 HC RX W HCPCS: Performed by: ANESTHESIOLOGY

## 2024-04-29 PROCEDURE — 3700000000 HC ANESTHESIA ATTENDED CARE: Performed by: UROLOGY

## 2024-04-29 PROCEDURE — 85730 THROMBOPLASTIN TIME PARTIAL: CPT

## 2024-04-29 PROCEDURE — 7100000001 HC PACU RECOVERY - ADDTL 15 MIN: Performed by: UROLOGY

## 2024-04-29 PROCEDURE — 2709999900 HC NON-CHARGEABLE SUPPLY: Performed by: UROLOGY

## 2024-04-29 PROCEDURE — 86901 BLOOD TYPING SEROLOGIC RH(D): CPT

## 2024-04-29 PROCEDURE — 86850 RBC ANTIBODY SCREEN: CPT

## 2024-04-29 PROCEDURE — 2500000003 HC RX 250 WO HCPCS

## 2024-04-29 PROCEDURE — 3600000009 HC SURGERY ROBOT BASE: Performed by: UROLOGY

## 2024-04-29 PROCEDURE — S2900 ROBOTIC SURGICAL SYSTEM: HCPCS | Performed by: UROLOGY

## 2024-04-29 DEVICE — IMPLANTABLE DEVICE: Type: IMPLANTABLE DEVICE | Site: PELVIS | Status: FUNCTIONAL

## 2024-04-29 RX ORDER — DOCUSATE SODIUM 100 MG/1
100 CAPSULE, LIQUID FILLED ORAL 2 TIMES DAILY PRN
Status: DISCONTINUED | OUTPATIENT
Start: 2024-04-29 | End: 2024-04-30 | Stop reason: HOSPADM

## 2024-04-29 RX ORDER — GLYCOPYRROLATE 0.2 MG/ML
INJECTION INTRAMUSCULAR; INTRAVENOUS PRN
Status: DISCONTINUED | OUTPATIENT
Start: 2024-04-29 | End: 2024-04-29 | Stop reason: SDUPTHER

## 2024-04-29 RX ORDER — SODIUM CHLORIDE 9 MG/ML
INJECTION, SOLUTION INTRAVENOUS CONTINUOUS
Status: DISCONTINUED | OUTPATIENT
Start: 2024-04-29 | End: 2024-04-30 | Stop reason: HOSPADM

## 2024-04-29 RX ORDER — SODIUM CHLORIDE 0.9 % (FLUSH) 0.9 %
5-40 SYRINGE (ML) INJECTION PRN
Status: DISCONTINUED | OUTPATIENT
Start: 2024-04-29 | End: 2024-04-29 | Stop reason: HOSPADM

## 2024-04-29 RX ORDER — ONDANSETRON 2 MG/ML
INJECTION INTRAMUSCULAR; INTRAVENOUS PRN
Status: DISCONTINUED | OUTPATIENT
Start: 2024-04-29 | End: 2024-04-29 | Stop reason: SDUPTHER

## 2024-04-29 RX ORDER — SODIUM CHLORIDE 9 MG/ML
INJECTION, SOLUTION INTRAVENOUS PRN
Status: DISCONTINUED | OUTPATIENT
Start: 2024-04-29 | End: 2024-04-29 | Stop reason: HOSPADM

## 2024-04-29 RX ORDER — FLUOXETINE HYDROCHLORIDE 20 MG/1
60 CAPSULE ORAL DAILY
Status: DISCONTINUED | OUTPATIENT
Start: 2024-04-30 | End: 2024-04-30 | Stop reason: HOSPADM

## 2024-04-29 RX ORDER — LIDOCAINE HYDROCHLORIDE AND EPINEPHRINE 10; 10 MG/ML; UG/ML
INJECTION, SOLUTION INFILTRATION; PERINEURAL
Status: COMPLETED | OUTPATIENT
Start: 2024-04-29 | End: 2024-04-29

## 2024-04-29 RX ORDER — ENOXAPARIN SODIUM 100 MG/ML
30 INJECTION SUBCUTANEOUS 2 TIMES DAILY
Status: DISCONTINUED | OUTPATIENT
Start: 2024-04-29 | End: 2024-04-30 | Stop reason: HOSPADM

## 2024-04-29 RX ORDER — SODIUM CHLORIDE 0.9 % (FLUSH) 0.9 %
5-40 SYRINGE (ML) INJECTION EVERY 12 HOURS SCHEDULED
Status: DISCONTINUED | OUTPATIENT
Start: 2024-04-29 | End: 2024-04-30 | Stop reason: HOSPADM

## 2024-04-29 RX ORDER — METHOCARBAMOL 100 MG/ML
INJECTION, SOLUTION INTRAMUSCULAR; INTRAVENOUS PRN
Status: DISCONTINUED | OUTPATIENT
Start: 2024-04-29 | End: 2024-04-29 | Stop reason: SDUPTHER

## 2024-04-29 RX ORDER — PROPOFOL 10 MG/ML
INJECTION, EMULSION INTRAVENOUS PRN
Status: DISCONTINUED | OUTPATIENT
Start: 2024-04-29 | End: 2024-04-29 | Stop reason: SDUPTHER

## 2024-04-29 RX ORDER — SODIUM CHLORIDE 0.9 % (FLUSH) 0.9 %
5-40 SYRINGE (ML) INJECTION PRN
Status: DISCONTINUED | OUTPATIENT
Start: 2024-04-29 | End: 2024-04-30 | Stop reason: HOSPADM

## 2024-04-29 RX ORDER — LIDOCAINE HYDROCHLORIDE 20 MG/ML
INJECTION, SOLUTION EPIDURAL; INFILTRATION; INTRACAUDAL; PERINEURAL PRN
Status: DISCONTINUED | OUTPATIENT
Start: 2024-04-29 | End: 2024-04-29 | Stop reason: SDUPTHER

## 2024-04-29 RX ORDER — FAMOTIDINE 10 MG/ML
INJECTION, SOLUTION INTRAVENOUS PRN
Status: DISCONTINUED | OUTPATIENT
Start: 2024-04-29 | End: 2024-04-29 | Stop reason: SDUPTHER

## 2024-04-29 RX ORDER — ONDANSETRON 2 MG/ML
4 INJECTION INTRAMUSCULAR; INTRAVENOUS
Status: DISCONTINUED | OUTPATIENT
Start: 2024-04-29 | End: 2024-04-29 | Stop reason: HOSPADM

## 2024-04-29 RX ORDER — APREPITANT 40 MG/1
40 CAPSULE ORAL ONCE
Status: COMPLETED | OUTPATIENT
Start: 2024-04-29 | End: 2024-04-29

## 2024-04-29 RX ORDER — PANTOPRAZOLE SODIUM 40 MG/1
40 TABLET, DELAYED RELEASE ORAL
Status: DISCONTINUED | OUTPATIENT
Start: 2024-04-30 | End: 2024-04-30 | Stop reason: HOSPADM

## 2024-04-29 RX ORDER — NALOXONE HYDROCHLORIDE 0.4 MG/ML
INJECTION, SOLUTION INTRAMUSCULAR; INTRAVENOUS; SUBCUTANEOUS PRN
Status: DISCONTINUED | OUTPATIENT
Start: 2024-04-29 | End: 2024-04-29 | Stop reason: HOSPADM

## 2024-04-29 RX ORDER — OXYCODONE HYDROCHLORIDE 5 MG/1
5 TABLET ORAL
Status: COMPLETED | OUTPATIENT
Start: 2024-04-29 | End: 2024-04-29

## 2024-04-29 RX ORDER — ACETAMINOPHEN 325 MG/1
650 TABLET ORAL EVERY 4 HOURS PRN
COMMUNITY

## 2024-04-29 RX ORDER — DEXAMETHASONE SODIUM PHOSPHATE 4 MG/ML
INJECTION, SOLUTION INTRA-ARTICULAR; INTRALESIONAL; INTRAMUSCULAR; INTRAVENOUS; SOFT TISSUE PRN
Status: DISCONTINUED | OUTPATIENT
Start: 2024-04-29 | End: 2024-04-29 | Stop reason: SDUPTHER

## 2024-04-29 RX ORDER — ONDANSETRON 2 MG/ML
4 INJECTION INTRAMUSCULAR; INTRAVENOUS EVERY 6 HOURS PRN
Status: DISCONTINUED | OUTPATIENT
Start: 2024-04-29 | End: 2024-04-30 | Stop reason: HOSPADM

## 2024-04-29 RX ORDER — SCOLOPAMINE TRANSDERMAL SYSTEM 1 MG/1
1 PATCH, EXTENDED RELEASE TRANSDERMAL ONCE
Status: DISCONTINUED | OUTPATIENT
Start: 2024-04-29 | End: 2024-04-30 | Stop reason: HOSPADM

## 2024-04-29 RX ORDER — DEXMEDETOMIDINE HYDROCHLORIDE 100 UG/ML
INJECTION, SOLUTION INTRAVENOUS PRN
Status: DISCONTINUED | OUTPATIENT
Start: 2024-04-29 | End: 2024-04-29 | Stop reason: SDUPTHER

## 2024-04-29 RX ORDER — MORPHINE SULFATE 4 MG/ML
4 INJECTION, SOLUTION INTRAMUSCULAR; INTRAVENOUS
Status: DISCONTINUED | OUTPATIENT
Start: 2024-04-29 | End: 2024-04-30 | Stop reason: HOSPADM

## 2024-04-29 RX ORDER — FENTANYL CITRATE 0.05 MG/ML
25 INJECTION, SOLUTION INTRAMUSCULAR; INTRAVENOUS EVERY 5 MIN PRN
Status: DISCONTINUED | OUTPATIENT
Start: 2024-04-29 | End: 2024-04-29 | Stop reason: HOSPADM

## 2024-04-29 RX ORDER — MIDAZOLAM HYDROCHLORIDE 1 MG/ML
INJECTION INTRAMUSCULAR; INTRAVENOUS PRN
Status: DISCONTINUED | OUTPATIENT
Start: 2024-04-29 | End: 2024-04-29 | Stop reason: SDUPTHER

## 2024-04-29 RX ORDER — ONDANSETRON 4 MG/1
4 TABLET, ORALLY DISINTEGRATING ORAL EVERY 8 HOURS PRN
Status: DISCONTINUED | OUTPATIENT
Start: 2024-04-29 | End: 2024-04-30 | Stop reason: HOSPADM

## 2024-04-29 RX ORDER — ROCURONIUM BROMIDE 10 MG/ML
INJECTION, SOLUTION INTRAVENOUS PRN
Status: DISCONTINUED | OUTPATIENT
Start: 2024-04-29 | End: 2024-04-29 | Stop reason: SDUPTHER

## 2024-04-29 RX ORDER — SODIUM CHLORIDE 0.9 % (FLUSH) 0.9 %
5-40 SYRINGE (ML) INJECTION EVERY 12 HOURS SCHEDULED
Status: DISCONTINUED | OUTPATIENT
Start: 2024-04-29 | End: 2024-04-29 | Stop reason: HOSPADM

## 2024-04-29 RX ORDER — OXYCODONE HYDROCHLORIDE 5 MG/1
5 TABLET ORAL EVERY 4 HOURS PRN
Status: DISCONTINUED | OUTPATIENT
Start: 2024-04-29 | End: 2024-04-30 | Stop reason: HOSPADM

## 2024-04-29 RX ORDER — MORPHINE SULFATE 2 MG/ML
2 INJECTION, SOLUTION INTRAMUSCULAR; INTRAVENOUS
Status: DISCONTINUED | OUTPATIENT
Start: 2024-04-29 | End: 2024-04-30 | Stop reason: HOSPADM

## 2024-04-29 RX ORDER — FENTANYL CITRATE 50 UG/ML
INJECTION, SOLUTION INTRAMUSCULAR; INTRAVENOUS PRN
Status: DISCONTINUED | OUTPATIENT
Start: 2024-04-29 | End: 2024-04-29 | Stop reason: SDUPTHER

## 2024-04-29 RX ORDER — SODIUM CHLORIDE 9 MG/ML
INJECTION, SOLUTION INTRAVENOUS PRN
Status: DISCONTINUED | OUTPATIENT
Start: 2024-04-29 | End: 2024-04-30 | Stop reason: HOSPADM

## 2024-04-29 RX ORDER — CIPROFLOXACIN 2 MG/ML
400 INJECTION, SOLUTION INTRAVENOUS ONCE
Status: COMPLETED | OUTPATIENT
Start: 2024-04-29 | End: 2024-04-29

## 2024-04-29 RX ORDER — MAGNESIUM HYDROXIDE 1200 MG/15ML
LIQUID ORAL CONTINUOUS PRN
Status: COMPLETED | OUTPATIENT
Start: 2024-04-29 | End: 2024-04-29

## 2024-04-29 RX ORDER — ACETAMINOPHEN 325 MG/1
650 TABLET ORAL EVERY 4 HOURS PRN
Status: DISCONTINUED | OUTPATIENT
Start: 2024-04-29 | End: 2024-04-30 | Stop reason: HOSPADM

## 2024-04-29 RX ADMIN — SUGAMMADEX 100 MG: 100 INJECTION, SOLUTION INTRAVENOUS at 14:42

## 2024-04-29 RX ADMIN — DOCUSATE SODIUM 100 MG: 100 CAPSULE, LIQUID FILLED ORAL at 20:54

## 2024-04-29 RX ADMIN — ROCURONIUM BROMIDE 20 MG: 10 SOLUTION INTRAVENOUS at 13:25

## 2024-04-29 RX ADMIN — CIPROFLOXACIN 400 MG: 2 INJECTION, SOLUTION INTRAVENOUS at 12:40

## 2024-04-29 RX ADMIN — SUGAMMADEX 100 MG: 100 INJECTION, SOLUTION INTRAVENOUS at 14:40

## 2024-04-29 RX ADMIN — ROCURONIUM BROMIDE 50 MG: 10 SOLUTION INTRAVENOUS at 12:32

## 2024-04-29 RX ADMIN — APREPITANT 40 MG: 40 CAPSULE ORAL at 11:57

## 2024-04-29 RX ADMIN — DEXMEDETOMIDINE 4 MCG: 100 INJECTION, SOLUTION INTRAVENOUS at 14:17

## 2024-04-29 RX ADMIN — PROPOFOL 200 MG: 10 INJECTION, EMULSION INTRAVENOUS at 12:32

## 2024-04-29 RX ADMIN — FAMOTIDINE 20 MG: 10 INJECTION, SOLUTION INTRAVENOUS at 12:39

## 2024-04-29 RX ADMIN — SODIUM CHLORIDE: 9 INJECTION, SOLUTION INTRAVENOUS at 18:25

## 2024-04-29 RX ADMIN — FENTANYL CITRATE 50 MCG: 50 INJECTION INTRAMUSCULAR; INTRAVENOUS at 12:47

## 2024-04-29 RX ADMIN — SODIUM CHLORIDE 50 ML/HR: 9 INJECTION, SOLUTION INTRAVENOUS at 11:40

## 2024-04-29 RX ADMIN — DEXMEDETOMIDINE 8 MCG: 100 INJECTION, SOLUTION INTRAVENOUS at 14:28

## 2024-04-29 RX ADMIN — HYDROMORPHONE HYDROCHLORIDE 0.5 MG: 1 INJECTION, SOLUTION INTRAMUSCULAR; INTRAVENOUS; SUBCUTANEOUS at 13:05

## 2024-04-29 RX ADMIN — SUGAMMADEX 100 MG: 100 INJECTION, SOLUTION INTRAVENOUS at 14:41

## 2024-04-29 RX ADMIN — SUGAMMADEX 100 MG: 100 INJECTION, SOLUTION INTRAVENOUS at 14:39

## 2024-04-29 RX ADMIN — METHOCARBAMOL 1000 MG: 100 INJECTION INTRAMUSCULAR; INTRAVENOUS at 13:03

## 2024-04-29 RX ADMIN — ROCURONIUM BROMIDE 30 MG: 10 SOLUTION INTRAVENOUS at 12:47

## 2024-04-29 RX ADMIN — SODIUM CHLORIDE: 9 INJECTION, SOLUTION INTRAVENOUS at 19:26

## 2024-04-29 RX ADMIN — GLYCOPYRROLATE 0.2 MG: 0.2 INJECTION INTRAMUSCULAR; INTRAVENOUS at 13:10

## 2024-04-29 RX ADMIN — LIDOCAINE HYDROCHLORIDE 100 MG: 20 INJECTION, SOLUTION EPIDURAL; INFILTRATION; INTRACAUDAL; PERINEURAL at 12:31

## 2024-04-29 RX ADMIN — OXYCODONE HYDROCHLORIDE 5 MG: 5 TABLET ORAL at 23:48

## 2024-04-29 RX ADMIN — SODIUM CHLORIDE: 9 INJECTION, SOLUTION INTRAVENOUS at 14:40

## 2024-04-29 RX ADMIN — DEXMEDETOMIDINE 8 MCG: 100 INJECTION, SOLUTION INTRAVENOUS at 14:37

## 2024-04-29 RX ADMIN — OXYCODONE HYDROCHLORIDE 5 MG: 5 TABLET ORAL at 17:32

## 2024-04-29 RX ADMIN — OXYCODONE HYDROCHLORIDE 5 MG: 5 TABLET ORAL at 19:57

## 2024-04-29 RX ADMIN — MIDAZOLAM 2 MG: 1 INJECTION INTRAMUSCULAR; INTRAVENOUS at 12:28

## 2024-04-29 RX ADMIN — ENOXAPARIN SODIUM 30 MG: 100 INJECTION SUBCUTANEOUS at 20:54

## 2024-04-29 RX ADMIN — MORPHINE SULFATE 4 MG: 4 INJECTION, SOLUTION INTRAMUSCULAR; INTRAVENOUS at 20:54

## 2024-04-29 RX ADMIN — HYDROMORPHONE HYDROCHLORIDE 0.5 MG: 1 INJECTION, SOLUTION INTRAMUSCULAR; INTRAVENOUS; SUBCUTANEOUS at 14:34

## 2024-04-29 RX ADMIN — ONDANSETRON 4 MG: 2 INJECTION INTRAMUSCULAR; INTRAVENOUS at 12:39

## 2024-04-29 RX ADMIN — FENTANYL CITRATE 50 MCG: 50 INJECTION INTRAMUSCULAR; INTRAVENOUS at 12:28

## 2024-04-29 RX ADMIN — DEXAMETHASONE SODIUM PHOSPHATE 10 MG: 4 INJECTION, SOLUTION INTRAMUSCULAR; INTRAVENOUS at 12:39

## 2024-04-29 ASSESSMENT — PAIN DESCRIPTION - DESCRIPTORS
DESCRIPTORS: SHARP;SPASM
DESCRIPTORS: SHARP
DESCRIPTORS: SHARP;STABBING
DESCRIPTORS: BURNING

## 2024-04-29 ASSESSMENT — PAIN DESCRIPTION - LOCATION
LOCATION: VAGINA;PELVIS
LOCATION: VAGINA;PELVIS
LOCATION: ABDOMEN;PELVIS;VAGINA

## 2024-04-29 ASSESSMENT — PAIN SCALES - GENERAL
PAINLEVEL_OUTOF10: 6
PAINLEVEL_OUTOF10: 6
PAINLEVEL_OUTOF10: 7
PAINLEVEL_OUTOF10: 9

## 2024-04-29 ASSESSMENT — PAIN DESCRIPTION - ORIENTATION
ORIENTATION: MID

## 2024-04-29 ASSESSMENT — PAIN - FUNCTIONAL ASSESSMENT
PAIN_FUNCTIONAL_ASSESSMENT: 0-10
PAIN_FUNCTIONAL_ASSESSMENT: PREVENTS OR INTERFERES SOME ACTIVE ACTIVITIES AND ADLS
PAIN_FUNCTIONAL_ASSESSMENT: 0-10
PAIN_FUNCTIONAL_ASSESSMENT: NONE - DENIES PAIN
PAIN_FUNCTIONAL_ASSESSMENT: ACTIVITIES ARE NOT PREVENTED
PAIN_FUNCTIONAL_ASSESSMENT: PREVENTS OR INTERFERES WITH MANY ACTIVE NOT PASSIVE ACTIVITIES
PAIN_FUNCTIONAL_ASSESSMENT: PREVENTS OR INTERFERES SOME ACTIVE ACTIVITIES AND ADLS

## 2024-04-29 NOTE — INTERVAL H&P NOTE
Update History & Physical    The patient's History and Physical was reviewed with the patient and I examined the patient. There was no change. The surgical site was confirmed by the patient and me.     Plan: The risks, benefits, expected outcome, and alternative to the recommended procedure have been discussed with the patient. Patient understands and wants to proceed with the procedure.     Electronically signed by Aruna Galvin MD on 4/29/2024 at 2:55 PM

## 2024-04-29 NOTE — BRIEF OP NOTE
Brief Postoperative Note      Patient: Olivia Marin  YOB: 1980  MRN: [id number]    Date of Procedure: 4/29/2024    Pre-Op Diagnosis Codes:     * Vesicovaginal fistula [N82.0]    Post-Op Diagnosis: Same       Procedure(s):  ROBOTIC REPAIR VESICOVAGINAL FISTULA  CYSTOSCOPY BILATERAL URETERAL STENT INSERTION AND CYSTOSCOPY BILATERAL STENT REMOVAL    Surgeon(s):  Aruna aGlvin MD    Assistant:  Surgical Assistant: Prabhu Johnston; Jason Renee    Anesthesia: General    Estimated Blood Loss (mL): less than 50     Complications: None    Specimens:   * No specimens in log *    Implants:  Implant Name Type Inv. Item Serial No.  Lot No. LRB No. Used Action   ALLOGRAFT PLACENTAL MATRIX 4CM X 4CM AMNIOBAND - [id number]  ALLOGRAFT PLACENTAL MATRIX 4CM X 4CM AMNIOBAND [id number] [employer]-  N/A 1 Implanted         Drains:   Urinary Catheter 04/29/24 2 Way (Active)       Findings:  Infection Present At Time Of Surgery (PATOS) (choose all levels that have infection present):  No infection present  Other Findings: small fistula repaired and amnio band membrane placed between vagina and bladder    Electronically signed by Aruna Galvin MD on 4/29/2024 at 2:56 PM

## 2024-04-29 NOTE — PROGRESS NOTES
Patient admitted to room 4276  from PACU. Patient is alert and oriented x's 4. Vitals recorded. Patient c/o feeling the need to urinate. Rivero catheter in place, patent and draining pink colored urine. 6 Glued lap site to abdomen, INES.  Patient oriented to room and call light. Dinner tray ordered. All question answered no concerns voiced. Patient resting in bed. Wheels locked, call light within reach.

## 2024-04-29 NOTE — PROGRESS NOTES
4 Eyes Skin Assessment     NAME:  Olivia Marin  YOB: 1980  MEDICAL RECORD NUMBER:  [id number]    The patient is being assessed for  Admission    I agree that at least one RN has performed a thorough Head to Toe Skin Assessment on the patient. ALL assessment sites listed below have been assessed.      Areas assessed by both nurses:    Head, Face, Ears, Shoulders, Back, Chest, Arms, Elbows, Hands, Sacrum. Buttock, Coccyx, Ischium, Legs. Feet and Heels, and Under Medical Devices         Does the Patient have a Wound? Yes wound(s) were present on assessment. LDA wound assessment was Initiated and completed by RN       Demond Prevention initiated by RN: No  Wound Care Orders initiated by RN: No    Pressure Injury (Stage 3,4, Unstageable, DTI, NWPT, and Complex wounds) if present, place Wound referral order by RN under : No    New Ostomies, if present place, Ostomy referral order under : No     Nurse 1 eSignature: Electronically signed by Idalmis Reyes RN on 4/29/24 at 7:15 PM EDT    **SHARE this note so that the co-signing nurse can place an eSignature**    Nurse 2 eSignature: Electronically signed by Gisel Santos RN on 4/29/24 at 7:20 PM EDT

## 2024-04-29 NOTE — ANESTHESIA POSTPROCEDURE EVALUATION
[facility] Department of Anesthesiology  Post-Anesthesia Note       Name:  Olivia Marin                                  Age:  43 y.o.  MRN:  [id number]     Last Vitals & Oxygen Saturation: BP (!) 151/89   Pulse 88   Temp 97 °F (36.1 °C)   Resp 15   Ht 1.778 m (5' 10\")   Wt 101.8 kg (224 lb 6.9 oz)   LMP 11/02/2023 (Exact Date)   SpO2 97%   BMI 32.20 kg/m²   Patient Vitals for the past 4 hrs:   BP Temp Temp src Pulse Resp SpO2   04/29/24 1700 (!) 151/89 97 °F (36.1 °C) -- 88 15 97 %   04/29/24 1645 137/83 -- -- 69 13 96 %   04/29/24 1630 135/80 -- -- 75 18 96 %   04/29/24 1615 135/84 -- -- 73 18 95 %   04/29/24 1600 123/85 -- -- 74 12 91 %   04/29/24 1545 127/80 -- -- 78 12 96 %   04/29/24 1530 120/79 -- -- 68 13 97 %   04/29/24 1515 118/70 -- -- 67 13 95 %   04/29/24 1510 125/67 -- -- 87 14 96 %   04/29/24 1507 109/71 97 °F (36.1 °C) Temporal 72 18 94 %   04/29/24 1505 109/71 -- -- 68 19 94 %   04/29/24 1502 107/85 97.3 °F (36.3 °C) Temporal 82 16 96 %       Level of consciousness:  Awake, alert to baseline    Respiratory: Respirations easy, no distress. Stable.    Cardiovascular: Hemodynamically stable.    Hydration: Adequate.    PONV: Adequately managed.    Post-op pain: Improved.    Post-op assessment: Tolerated anesthetic well without complication.    Complications:  None.    Santo Alvarez MD  April 29, 2024   5:34 PM

## 2024-04-29 NOTE — ANESTHESIA PRE PROCEDURE
[facility] Department of Anesthesiology  Pre-Anesthesia Evaluation/Consultation       Name:  Olivia Marin  : 1980  Age:  43 y.o.                                           MRN:  [id number]  Date: 2024           Surgeon: Surgeon(s):  Aruna Galvin MD    Procedure: Procedure(s):  ROBOTIC REPAIR VESICOVAGINAL FISTULA WITH CYSTOSCOPY WITH BILATERAL URETERAL STENT PLACEMENT     Allergies   Allergen Reactions    Oxycodone-Acetaminophen Nausea And Vomiting     Headache for 3 days    Amoxicillin Nausea And Vomiting    Augmentin [Amoxicillin-Pot Clavulanate] Nausea And Vomiting    Sulfa Antibiotics Nausea And Vomiting     Patient Active Problem List   Diagnosis    Encounter for preadmission testing    Retention of urine, unspecified     Past Medical History:   Diagnosis Date    Depression     Endometriosis     GERD (gastroesophageal reflux disease)     History of adverse reaction to anesthesia     PONV (postoperative nausea and vomiting)     Prolonged emergence from general anesthesia      Past Surgical History:   Procedure Laterality Date    ABDOMINOPLASTY      APPENDECTOMY      CYSTOSCOPY N/A 2023    CYSTOSCOPY performed by Misha Myers MD at [facility] OR    HERNIA REPAIR Bilateral     HYSTERECTOMY (CERVIX STATUS UNKNOWN) Bilateral 2023    ROBOTIC HYSTERECTOMY WITH BILATERAL SALPINGO-OOPHORECTOMY WITH EXCISION OF ENDOMETRIOSIS, LYSIS OF ADHESIONS performed by Velia Amanda MD at [facility] OR    LAPAROSCOPY      x2    RHINOPLASTY Bilateral     TONSILLECTOMY      URETHRAL SURGERY N/A 2023    RETROPUBIC SLING performed by Misha Myers MD at [facility] OR    URETHRAL SURGERY N/A 2024    URETHRAL SLING TAKEDOWN performed by Misha Myers MD at [facility] OR     Social History     Tobacco Use    Smoking status: Former     Current packs/day: 0.00     Average packs/day: 1 pack/day for 15.0 years (15.0 ttl pk-yrs)     Types: Cigarettes     Start date:      Quit date:

## 2024-04-30 VITALS
HEART RATE: 78 BPM | BODY MASS INDEX: 32.22 KG/M2 | DIASTOLIC BLOOD PRESSURE: 73 MMHG | RESPIRATION RATE: 16 BRPM | HEIGHT: 70 IN | OXYGEN SATURATION: 93 % | SYSTOLIC BLOOD PRESSURE: 114 MMHG | WEIGHT: 225.09 LBS | TEMPERATURE: 98.3 F

## 2024-04-30 PROCEDURE — 6360000002 HC RX W HCPCS: Performed by: UROLOGY

## 2024-04-30 PROCEDURE — 2580000003 HC RX 258: Performed by: UROLOGY

## 2024-04-30 PROCEDURE — 94760 N-INVAS EAR/PLS OXIMETRY 1: CPT

## 2024-04-30 PROCEDURE — 6370000000 HC RX 637 (ALT 250 FOR IP): Performed by: UROLOGY

## 2024-04-30 RX ORDER — OXYCODONE HYDROCHLORIDE 5 MG/1
5 TABLET ORAL EVERY 6 HOURS PRN
Qty: 12 TABLET | Refills: 0 | Status: SHIPPED | OUTPATIENT
Start: 2024-04-30 | End: 2024-04-30

## 2024-04-30 RX ORDER — OXYCODONE HYDROCHLORIDE 5 MG/1
5 TABLET ORAL EVERY 6 HOURS PRN
Qty: 12 TABLET | Refills: 0 | Status: SHIPPED | OUTPATIENT
Start: 2024-04-30 | End: 2024-05-03

## 2024-04-30 RX ADMIN — PANTOPRAZOLE SODIUM 40 MG: 40 TABLET, DELAYED RELEASE ORAL at 06:23

## 2024-04-30 RX ADMIN — OXYCODONE HYDROCHLORIDE 5 MG: 5 TABLET ORAL at 06:22

## 2024-04-30 RX ADMIN — ENOXAPARIN SODIUM 30 MG: 100 INJECTION SUBCUTANEOUS at 09:11

## 2024-04-30 RX ADMIN — ONDANSETRON 4 MG: 2 INJECTION INTRAMUSCULAR; INTRAVENOUS at 01:03

## 2024-04-30 RX ADMIN — Medication 10 ML: at 09:10

## 2024-04-30 RX ADMIN — MORPHINE SULFATE 2 MG: 2 INJECTION, SOLUTION INTRAMUSCULAR; INTRAVENOUS at 09:07

## 2024-04-30 RX ADMIN — DOCUSATE SODIUM 100 MG: 100 CAPSULE, LIQUID FILLED ORAL at 09:17

## 2024-04-30 RX ADMIN — FLUOXETINE HYDROCHLORIDE 60 MG: 20 CAPSULE ORAL at 09:07

## 2024-04-30 RX ADMIN — MORPHINE SULFATE 2 MG: 2 INJECTION, SOLUTION INTRAMUSCULAR; INTRAVENOUS at 02:19

## 2024-04-30 ASSESSMENT — PAIN DESCRIPTION - DESCRIPTORS
DESCRIPTORS: BURNING;DISCOMFORT
DESCRIPTORS: SHARP
DESCRIPTORS: BURNING;DISCOMFORT

## 2024-04-30 ASSESSMENT — PAIN DESCRIPTION - ORIENTATION
ORIENTATION: LOWER;MID;UPPER
ORIENTATION: LOWER;MID;UPPER
ORIENTATION: MID

## 2024-04-30 ASSESSMENT — PAIN DESCRIPTION - LOCATION
LOCATION: ABDOMEN
LOCATION: ABDOMEN

## 2024-04-30 ASSESSMENT — PAIN - FUNCTIONAL ASSESSMENT: PAIN_FUNCTIONAL_ASSESSMENT: ACTIVITIES ARE NOT PREVENTED

## 2024-04-30 ASSESSMENT — PAIN SCALES - GENERAL
PAINLEVEL_OUTOF10: 7
PAINLEVEL_OUTOF10: 5
PAINLEVEL_OUTOF10: 6

## 2024-04-30 NOTE — OP NOTE
[facility]          [address] [place] [address]                            OPERATIVE REPORT      PATIENT NAME: SASHA ALLEN             : 1980  MED REC NO: [id number]                      ROOM: [place] [address]  ACCOUNT NO: [id number]                       ADMIT DATE: 2024  PROVIDER: Aruna Galvin MD      DATE OF PROCEDURE:  2024    SURGEON:  Aruna Galvin MD    PREOPERATIVE DIAGNOSIS:  Vesicovaginal fistula.    POSTOPERATIVE DIAGNOSIS:  Vesicovaginal fistula.    PROCEDURES:    1. Robotic-assisted laparoscopic repair of vesicovaginal fistula with placement of AmnioBand membrane.  2. Cystoscopy with bilateral ureteral stent insertion.  3. Cystoscopy with bilateral ureteral stent removal.    ANESTHESIA:  General.    COMPLICATIONS:  None.    BLOOD LOSS:  Less than 50 mL.    INDICATIONS:  43-year-old female underwent a robotic-assisted laparoscopic hysterectomy in 2023.  She developed significant incontinence about 2 weeks postoperatively.  She was eventually diagnosed with a vesicovaginal fistula, and she is here for repair of the fistula.    DETAILS OF THE PROCEDURE:  She is given Cipro.  She is taken to the operative suite.  Pneumoboots were on and working.  She moved onto the table in the supine position.  General anesthesia was induced.  Her position was changed to the lithotomy position.  Her abdomen and genitalia were prepped and draped.  The 21-Danish cystoscope advanced through the urethra into the bladder.  The small fistula was seen in the posterior bladder wall just proximal to the trigone and just left of the midline.  Both ureteral orifices were normal.  I elected to place stents to help protect the ureters.  The wire was advanced through the left ureteral orifice and then a 6-Danish x 24 cm double-J stent was advanced over the wire.  The wire was withdrawn.  This was repeated on the patient's right

## 2024-04-30 NOTE — DISCHARGE INSTRUCTIONS
No lifting > 15 pounds X 6 weeks    No strenuous exercise X 6  weeks    No driving on pain meds    OK to shower, no tub baths or soaking in water    Townsend to gravity drainage    Call for fever > 101, townsend not draining, any concerns

## 2024-04-30 NOTE — PROGRESS NOTES
Patient discharged home per Dr. Galvin.   IV removed tolerated well. Rivero Catheter remains in place, patient switched collection bag to leg bag. Rivero patent and continues to drain belinda yellow urine.    Discharge paperwork reviewed with patient all questions answered, no concerns voiced.  Patient left unit with via wheelchair assisted by staff. Patient has pardeep, script, belongings   Transported home via family vehicle.

## 2024-04-30 NOTE — PLAN OF CARE
Problem: Discharge Planning  Goal: Discharge to home or other facility with appropriate resources  4/30/2024 0942 by Idalmis Reyes, RN  Outcome: Progressing     Problem: Pain  Goal: Verbalizes/displays adequate comfort level or baseline comfort level  4/30/2024 0942 by Idalmis Reyes, RN  Outcome: Progressing     Problem: Safety - Adult  Goal: Free from fall injury  4/30/2024 0942 by Idalmis Reyes, RN  Outcome: Progressing     Problem: ABCDS Injury Assessment  Goal: Absence of physical injury  4/30/2024 0942 by Idalmis Reyes, RN  Outcome: Progressing

## 2024-04-30 NOTE — CARE COORDINATION
Quick chart review revealed pt is here for procedure. Pt is discharging today.   Likely no needs for home- has insurance and PCP through [facility]. At this time, an assessment is not warranted.   But will follow.     Discharge Planning:      (CM) reviewed the patient's chart to assess needs. Patient's Readmission Risk Score is   . Patient's medical insurance is  Payor: DARYN / Plan: DARYN ARRINGTON FABI / Product Type: *No Product type* / .  Patient's PCP is None, None (Inactive) .  No needs anticipated, at this time. CM team to follow. Staff to inform CM if additional discharge needs arise.    Pts preferred pharmacy is   [facility] [id number] - [place], [place] - [address] - P [phone] - F [phone]  [address] [place] [place] [address]  Phone: [phone] Fax: [phone]     Hugo Mckeon LMSW, [facility] Social Work Case Management   Phone: [phone]  Fax: [phone]

## 2024-04-30 NOTE — PLAN OF CARE
Problem: Discharge Planning  Goal: Discharge to home or other facility with appropriate resources  4/30/2024 0413 by Divya Saucedo RN  Outcome: Progressing  4/29/2024 1808 by Idalmis Reyes RN  Outcome: Progressing     Problem: Pain  Goal: Verbalizes/displays adequate comfort level or baseline comfort level  4/30/2024 0413 by Divya Saucedo RN  Outcome: Progressing  4/29/2024 1808 by Idalmis Reyes RN  Outcome: Progressing     Problem: Safety - Adult  Goal: Free from fall injury  4/30/2024 0413 by Divya Saucedo RN  Outcome: Progressing  4/29/2024 1808 by Idalmis Reyes RN  Outcome: Progressing     Problem: ABCDS Injury Assessment  Goal: Absence of physical injury  4/30/2024 0413 by Divya Saucedo RN  Outcome: Progressing  4/29/2024 1808 by Idalmis Reyes, RN  Outcome: Progressing

## 2024-04-30 NOTE — PLAN OF CARE
Problem: Discharge Planning  Goal: Discharge to home or other facility with appropriate resources  4/30/2024 0544 by Mariola Campbell RN  Outcome: Progressing  4/30/2024 0413 by Divya Saucedo RN  Outcome: Progressing  4/29/2024 1808 by Idalmis Reyes RN  Outcome: Progressing     Problem: Pain  Goal: Verbalizes/displays adequate comfort level or baseline comfort level  4/30/2024 0544 by Mariola Campbell RN  Outcome: Progressing  4/30/2024 0413 by Divya Saucedo RN  Outcome: Progressing  4/29/2024 1808 by Idalmis Reyes RN  Outcome: Progressing     Problem: Safety - Adult  Goal: Free from fall injury  4/30/2024 0544 by Mariola Campbell RN  Outcome: Progressing  4/30/2024 0413 by Divya Saucedo RN  Outcome: Progressing  4/29/2024 1808 by Idalmis Reyes RN  Outcome: Progressing     Problem: ABCDS Injury Assessment  Goal: Absence of physical injury  4/30/2024 0544 by Mariola Campbell RN  Outcome: Progressing  4/30/2024 0413 by Divya Saucedo RN  Outcome: Progressing  4/29/2024 1808 by Idalmis Reyes RN  Outcome: Progressing

## 2024-04-30 NOTE — DISCHARGE SUMMARY
Physician Discharge Summary     Patient ID:  Olivia Marin  [id number]  43 y.o.  1980    Admit date: 4/29/2024    Discharge date and time: No discharge date for patient encounter.     Admitting Physician: Aruna Galvin MD     Discharge Physician: romain    Admission Diagnoses: Vesicovaginal fistula [N82.0]    Discharge Diagnoses: vesicovaginal fistula    Admission Condition: good    Discharged Condition: good    Indication for Admission: vesicovaginal fistula    Hospital Course: she had an uneventful post op course after robotic repair of vesicovaginal fistula    Consults: none        Treatments: robotic repair of vesicovaginal fistula        Disposition: home    In process/preliminary results:  Outstanding Order Results       No orders found for last 30 day(s).            Patient Instructions:   Current Discharge Medication List        START taking these medications    Details   oxyCODONE (ROXICODONE) 5 MG immediate release tablet Take 1 tablet by mouth every 6 hours as needed for Pain for up to 3 days. Max Daily Amount: 20 mg  Qty: 12 tablet, Refills: 0    Comments: Reduce doses taken as pain becomes manageable  Associated Diagnoses: Vesicovaginal fistula           CONTINUE these medications which have NOT CHANGED    Details   acetaminophen (TYLENOL) 325 MG tablet Take 2 tablets by mouth every 4 hours as needed for Pain      ibuprofen (ADVIL;MOTRIN) 800 MG tablet Take 1 tablet by mouth every 8 hours as needed for Pain  Qty: 40 tablet, Refills: 1      docusate sodium (COLACE) 100 MG capsule Take 1 capsule by mouth 2 times daily as needed for Constipation  Qty: 60 capsule, Refills: 1      ondansetron (ZOFRAN) 4 MG tablet Take 1 tablet by mouth every 6 hours as needed for Nausea or Vomiting  Qty: 30 tablet, Refills: 1      estradiol (VIVELLE-DOT) 0.0375 MG/24HR Place 1 patch onto the skin Twice a Week  Qty: 8 patch, Refills: 3      FLUoxetine (PROZAC) 20 MG capsule 3 capsules daily

## 2024-05-01 ENCOUNTER — TRANSCRIBE ORDERS (OUTPATIENT)
Dept: ADMINISTRATIVE | Age: 44
End: 2024-05-01

## 2024-05-01 DIAGNOSIS — N82.0 VESICOVAGINAL FISTULA: Primary | ICD-10-CM

## 2024-05-07 ENCOUNTER — APPOINTMENT (OUTPATIENT)
Dept: CT IMAGING | Age: 44
End: 2024-05-07
Payer: COMMERCIAL

## 2024-05-07 ENCOUNTER — HOSPITAL ENCOUNTER (EMERGENCY)
Age: 44
Discharge: HOME | End: 2024-05-07
Payer: COMMERCIAL

## 2024-05-07 VITALS
BODY MASS INDEX: 32.27 KG/M2 | DIASTOLIC BLOOD PRESSURE: 81 MMHG | RESPIRATION RATE: 12 BRPM | TEMPERATURE: 97.6 F | SYSTOLIC BLOOD PRESSURE: 136 MMHG | OXYGEN SATURATION: 97 % | HEART RATE: 73 BPM | WEIGHT: 224.87 LBS

## 2024-05-07 DIAGNOSIS — R10.11 ABDOMINAL PAIN, RIGHT UPPER QUADRANT: Primary | ICD-10-CM

## 2024-05-07 DIAGNOSIS — R91.1 PULMONARY NODULE: ICD-10-CM

## 2024-05-07 DIAGNOSIS — K80.20 CALCULUS OF GALLBLADDER WITHOUT CHOLECYSTITIS WITHOUT OBSTRUCTION: ICD-10-CM

## 2024-05-07 LAB
ALBUMIN SERPL-MCNC: 4.3 G/DL (ref 3.4–5)
ALBUMIN/GLOB SERPL: 1.5 {RATIO} (ref 1.1–2.2)
ALP SERPL-CCNC: 66 U/L (ref 40–129)
ALT SERPL-CCNC: 19 U/L (ref 10–40)
ANION GAP SERPL CALCULATED.3IONS-SCNC: 14 MMOL/L (ref 3–16)
AST SERPL-CCNC: 15 U/L (ref 15–37)
BACTERIA URNS QL MICRO: ABNORMAL /HPF
BASOPHILS # BLD: 0.1 K/UL (ref 0–0.2)
BASOPHILS NFR BLD: 0.9 %
BILIRUB SERPL-MCNC: 0.3 MG/DL (ref 0–1)
BILIRUB UR QL STRIP.AUTO: NEGATIVE
BUN SERPL-MCNC: 16 MG/DL (ref 7–20)
CALCIUM SERPL-MCNC: 9.3 MG/DL (ref 8.3–10.6)
CHLORIDE SERPL-SCNC: 104 MMOL/L (ref 99–110)
CLARITY UR: CLEAR
CO2 SERPL-SCNC: 22 MMOL/L (ref 21–32)
COLOR UR: YELLOW
CREAT SERPL-MCNC: 0.9 MG/DL (ref 0.6–1.1)
DEPRECATED RDW RBC AUTO: 13.3 % (ref 12.4–15.4)
EOSINOPHIL # BLD: 0.2 K/UL (ref 0–0.6)
EOSINOPHIL NFR BLD: 2.3 %
EPI CELLS #/AREA URNS AUTO: 1 /HPF (ref 0–5)
GFR SERPLBLD CREATININE-BSD FMLA CKD-EPI: 81 ML/MIN/{1.73_M2}
GLUCOSE SERPL-MCNC: 126 MG/DL (ref 70–99)
GLUCOSE UR STRIP.AUTO-MCNC: NEGATIVE MG/DL
HCG UR QL: NEGATIVE
HCT VFR BLD AUTO: 42 % (ref 36–48)
HGB BLD-MCNC: 14.5 G/DL (ref 12–16)
HGB UR QL STRIP.AUTO: ABNORMAL
HYALINE CASTS #/AREA URNS AUTO: 0 /LPF (ref 0–8)
KETONES UR STRIP.AUTO-MCNC: NEGATIVE MG/DL
LEUKOCYTE ESTERASE UR QL STRIP.AUTO: NEGATIVE
LIPASE SERPL-CCNC: 38 U/L (ref 13–60)
LYMPHOCYTES # BLD: 3.3 K/UL (ref 1–5.1)
LYMPHOCYTES NFR BLD: 36.4 %
MCH RBC QN AUTO: 29.8 PG (ref 26–34)
MCHC RBC AUTO-ENTMCNC: 34.5 G/DL (ref 31–36)
MCV RBC AUTO: 86.6 FL (ref 80–100)
MONOCYTES # BLD: 0.6 K/UL (ref 0–1.3)
MONOCYTES NFR BLD: 7.1 %
NEUTROPHILS # BLD: 4.8 K/UL (ref 1.7–7.7)
NEUTROPHILS NFR BLD: 53.3 %
NITRITE UR QL STRIP.AUTO: NEGATIVE
PH UR STRIP.AUTO: 7 [PH] (ref 5–8)
PLATELET # BLD AUTO: 223 K/UL (ref 135–450)
PMV BLD AUTO: 8.3 FL (ref 5–10.5)
POTASSIUM SERPL-SCNC: 3.8 MMOL/L (ref 3.5–5.1)
PROT SERPL-MCNC: 7.1 G/DL (ref 6.4–8.2)
PROT UR STRIP.AUTO-MCNC: 30 MG/DL
RBC # BLD AUTO: 4.86 M/UL (ref 4–5.2)
RBC CLUMPS #/AREA URNS AUTO: 10 /HPF (ref 0–4)
SODIUM SERPL-SCNC: 140 MMOL/L (ref 136–145)
SP GR UR STRIP.AUTO: 1.02 (ref 1–1.03)
UA COMPLETE W REFLEX CULTURE PNL UR: ABNORMAL
UA DIPSTICK W REFLEX MICRO PNL UR: YES
URN SPEC COLLECT METH UR: ABNORMAL
UROBILINOGEN UR STRIP-ACNC: 0.2 E.U./DL
WBC # BLD AUTO: 9 K/UL (ref 4–11)
WBC #/AREA URNS AUTO: 3 /HPF (ref 0–5)

## 2024-05-07 PROCEDURE — 85025 COMPLETE CBC W/AUTO DIFF WBC: CPT

## 2024-05-07 PROCEDURE — 81001 URINALYSIS AUTO W/SCOPE: CPT

## 2024-05-07 PROCEDURE — 36415 COLL VENOUS BLD VENIPUNCTURE: CPT

## 2024-05-07 PROCEDURE — 84703 CHORIONIC GONADOTROPIN ASSAY: CPT

## 2024-05-07 PROCEDURE — 80053 COMPREHEN METABOLIC PANEL: CPT

## 2024-05-07 PROCEDURE — 6360000002 HC RX W HCPCS: Performed by: PHYSICIAN ASSISTANT

## 2024-05-07 PROCEDURE — 6360000004 HC RX CONTRAST MEDICATION: Performed by: PHYSICIAN ASSISTANT

## 2024-05-07 PROCEDURE — 96374 THER/PROPH/DIAG INJ IV PUSH: CPT

## 2024-05-07 PROCEDURE — 96375 TX/PRO/DX INJ NEW DRUG ADDON: CPT

## 2024-05-07 PROCEDURE — 99285 EMERGENCY DEPT VISIT HI MDM: CPT

## 2024-05-07 PROCEDURE — 83690 ASSAY OF LIPASE: CPT

## 2024-05-07 PROCEDURE — 74177 CT ABD & PELVIS W/CONTRAST: CPT

## 2024-05-07 PROCEDURE — 2580000003 HC RX 258: Performed by: PHYSICIAN ASSISTANT

## 2024-05-07 RX ORDER — MORPHINE SULFATE 4 MG/ML
4 INJECTION, SOLUTION INTRAMUSCULAR; INTRAVENOUS ONCE
Status: COMPLETED | OUTPATIENT
Start: 2024-05-07 | End: 2024-05-07

## 2024-05-07 RX ORDER — 0.9 % SODIUM CHLORIDE 0.9 %
500 INTRAVENOUS SOLUTION INTRAVENOUS ONCE
Status: COMPLETED | OUTPATIENT
Start: 2024-05-07 | End: 2024-05-07

## 2024-05-07 RX ORDER — KETOROLAC TROMETHAMINE 10 MG/1
TABLET, FILM COATED ORAL
Qty: 12 TABLET | Refills: 0 | Status: SHIPPED | OUTPATIENT
Start: 2024-05-07

## 2024-05-07 RX ORDER — OXYCODONE HYDROCHLORIDE AND ACETAMINOPHEN 5; 325 MG/1; MG/1
1 TABLET ORAL EVERY 6 HOURS PRN
Qty: 10 TABLET | Refills: 0 | Status: SHIPPED | OUTPATIENT
Start: 2024-05-07 | End: 2024-05-10

## 2024-05-07 RX ORDER — ONDANSETRON 2 MG/ML
4 INJECTION INTRAMUSCULAR; INTRAVENOUS ONCE
Status: COMPLETED | OUTPATIENT
Start: 2024-05-07 | End: 2024-05-07

## 2024-05-07 RX ORDER — KETOROLAC TROMETHAMINE 30 MG/ML
30 INJECTION, SOLUTION INTRAMUSCULAR; INTRAVENOUS ONCE
Status: COMPLETED | OUTPATIENT
Start: 2024-05-07 | End: 2024-05-07

## 2024-05-07 RX ADMIN — HYDROMORPHONE HYDROCHLORIDE 1 MG: 1 INJECTION, SOLUTION INTRAMUSCULAR; INTRAVENOUS; SUBCUTANEOUS at 12:50

## 2024-05-07 RX ADMIN — ONDANSETRON 4 MG: 2 INJECTION INTRAMUSCULAR; INTRAVENOUS at 11:23

## 2024-05-07 RX ADMIN — MORPHINE SULFATE 4 MG: 4 INJECTION, SOLUTION INTRAMUSCULAR; INTRAVENOUS at 11:23

## 2024-05-07 RX ADMIN — SODIUM CHLORIDE 500 ML: 9 INJECTION, SOLUTION INTRAVENOUS at 11:24

## 2024-05-07 RX ADMIN — IOPAMIDOL 75 ML: 755 INJECTION, SOLUTION INTRAVENOUS at 12:24

## 2024-05-07 RX ADMIN — KETOROLAC TROMETHAMINE 30 MG: 30 INJECTION, SOLUTION INTRAMUSCULAR at 14:38

## 2024-05-07 ASSESSMENT — PAIN DESCRIPTION - LOCATION
LOCATION: ABDOMEN

## 2024-05-07 ASSESSMENT — PAIN SCALES - GENERAL
PAINLEVEL_OUTOF10: 10
PAINLEVEL_OUTOF10: 9
PAINLEVEL_OUTOF10: 10
PAINLEVEL_OUTOF10: 10

## 2024-05-07 ASSESSMENT — PAIN DESCRIPTION - ORIENTATION
ORIENTATION: RIGHT;UPPER
ORIENTATION: RIGHT;MID

## 2024-05-07 ASSESSMENT — ENCOUNTER SYMPTOMS
NAUSEA: 1
SHORTNESS OF BREATH: 0
COUGH: 0
ABDOMINAL PAIN: 1
BACK PAIN: 0
SORE THROAT: 0
EYE PAIN: 0
VOMITING: 1

## 2024-05-07 ASSESSMENT — PAIN DESCRIPTION - DESCRIPTORS: DESCRIPTORS: STABBING;CRAMPING

## 2024-05-07 ASSESSMENT — PAIN - FUNCTIONAL ASSESSMENT: PAIN_FUNCTIONAL_ASSESSMENT: PREVENTS OR INTERFERES SOME ACTIVE ACTIVITIES AND ADLS

## 2024-05-07 ASSESSMENT — PAIN DESCRIPTION - PAIN TYPE: TYPE: ACUTE PAIN

## 2024-05-07 NOTE — DISCHARGE INSTRUCTIONS
Take prescribed medication as prescribed only  Take half or 1/4 tablet of the Toradol as needed.  If you start having epigastric abdominal pain stop taking the Toradol.  Do not take any other anti-inflammatory while taking the Toradol.  No Motrin, no Advil, no Aleve.  Follow-up with Dr. Caldera general surgeon for your gallbladder  Follow-up with your urologist Dr. Ferreira  Return emergency room for any worsening

## 2024-05-07 NOTE — ED NOTES
Provider order placed for patient's discharge. Provider reviewed decision to discharge with the patient. Discharge paperwork and any prescriptions were reviewed with the patient. Patient verbalized understanding of discharge education and any prescriptions and has no further questions or further needs at this time. Patient left with all personal belongings and was stable upon departure. Patient thanked for choosing [facility] and informed to return should any need arise.

## 2024-05-07 NOTE — ED TRIAGE NOTES
RUQ pain since 0800. States she has HX gallstones. SX last Monday cystoscopy/repair of vaginal fistula. VSS

## 2024-05-07 NOTE — ED NOTES
Patient requesting townsend catheter not be changed out for urine sample due to recent surgery and it to not be removed x2 weeks, Sampson LU notified.

## 2024-05-07 NOTE — ED PROVIDER NOTES
middle lobe.    Discussed patient CT results with her.  And inform her that is not showing any acute cholecystitis.  She is supposed to be seeing Dr. Caldera.  But she could not see him at that time due to the surgery she had with Dr. Ferreira.    Patient was given Toradol 30 mg IV.  This did help with her pain.  She is feeling much better.  I informed her I can give her Toradol for home.  As well as I will give her a few Percocet for pain.  She is to call Dr. Caldera schedule follow-up appointment.  And return emergency room for any worsening.  She understood and she is okay with this plan.    Disposition Considerations (Tests not ordered but considered, Shared Decision Making, Pt Expectation of Test or Tx.):     See discussion above       The patient tolerated their visit well.  I evaluated the patient.  The physician was available for consultation as needed.  The patient and / or the family were informed of the results of any tests, a time was given to answer questions, a plan was proposed and they agreed with plan.     I am the Primary Clinician of Record.     CLINICAL IMPRESSION:  1. Abdominal pain, right upper quadrant    2. Calculus of gallbladder without cholecystitis without obstruction    3. Pulmonary nodule        DISPOSITION Decision To Discharge 05/07/2024 03:36:59 PM      PATIENT REFERRED TO:  Pallavi Juarez MD  [address] [address] [phone]    Call   As needed    Santana Caldera MD  [address]  [address]  [facility] [place] [facility] [address]  [phone]    Call in 1 day  For follow-up appointment      DISCHARGE MEDICATIONS:  New Prescriptions    KETOROLAC (TORADOL) 10 MG TABLET    Take 1/2 to 1 tablet every 8 hours as needed for pain    OXYCODONE-ACETAMINOPHEN (PERCOCET) 5-325 MG PER TABLET    Take 1 tablet by mouth every 6 hours as needed for Pain for up to 3 days. Max Daily Amount: 4 tablets       DISCONTINUED MEDICATIONS:  Discontinued Medications    No

## 2024-05-13 ENCOUNTER — HOSPITAL ENCOUNTER (OUTPATIENT)
Dept: GENERAL RADIOLOGY | Age: 44
Discharge: HOME | End: 2024-05-13
Attending: UROLOGY
Payer: COMMERCIAL

## 2024-05-13 DIAGNOSIS — N82.0 VESICOVAGINAL FISTULA: ICD-10-CM

## 2024-05-13 PROCEDURE — 6360000004 HC RX CONTRAST MEDICATION: Performed by: UROLOGY

## 2024-05-13 PROCEDURE — 51600 INJECTION FOR BLADDER X-RAY: CPT

## 2024-05-13 RX ADMIN — IOTHALAMATE MEGLUMINE 200 ML: 172 INJECTION URETERAL at 10:55

## 2024-05-30 ENCOUNTER — INITIAL CONSULT (OUTPATIENT)
Dept: SURGERY | Age: 44
End: 2024-05-30
Payer: COMMERCIAL

## 2024-05-30 VITALS
SYSTOLIC BLOOD PRESSURE: 141 MMHG | HEART RATE: 89 BPM | DIASTOLIC BLOOD PRESSURE: 103 MMHG | WEIGHT: 230 LBS | BODY MASS INDEX: 33 KG/M2

## 2024-05-30 DIAGNOSIS — K21.9 GASTROESOPHAGEAL REFLUX DISEASE, UNSPECIFIED WHETHER ESOPHAGITIS PRESENT: Primary | ICD-10-CM

## 2024-05-30 DIAGNOSIS — K59.09 CHRONIC CONSTIPATION: ICD-10-CM

## 2024-05-30 DIAGNOSIS — K80.20 CALCULUS OF GALLBLADDER WITHOUT CHOLECYSTITIS WITHOUT OBSTRUCTION: ICD-10-CM

## 2024-05-30 PROCEDURE — 99204 OFFICE O/P NEW MOD 45 MIN: CPT | Performed by: SURGERY

## 2024-05-30 RX ORDER — PANTOPRAZOLE SODIUM 40 MG/1
40 TABLET, DELAYED RELEASE ORAL 2 TIMES DAILY
Qty: 60 TABLET | Refills: 3 | Status: SHIPPED | OUTPATIENT
Start: 2024-05-30

## 2024-05-30 NOTE — PATIENT INSTRUCTIONS
Surgeon: Santana Caldera MD  Your surgery will be at [facility]  First floor of the [facility]- SURGERY DESK  [address]  [place], [place] [address]    Date:    Arrival time:    Surgery time:     (   ) Outpatient with general anesthesia         (   ) Outpatient with IV sedation          You will need to have a  over the age of 18 with a drivers license drive you home.   You will need someone to stay with you for 24 hours after surgery due to anaesthesia.  Nothing to eat or drink after midnight the night before. FASTING SURGERY   You may take all your regular maintenance prescriptions in the morning with a small sip of water to wash them down.   No driving for 1 week after surgery. (Or while on pain medication)     You may be asked to stop blood thinners 2-5 days prior to surgery such as Coumadin, Plavix, Xaretlo, Eliquis,and over the counter fish oil (Omega 3)     Last dose: ______________    Restrictions: 15lbs weight restrictions for 4 weeks after surgery.    Working patients:   Patient is to remain off work for 2 weeks after surgery.   Patient may return to work LIGHT DUTY (15lbs weight restriction) after 2 weeks.   If LIGHT DUTY is not allowed, patient must remain off work for the full 4 weeks.     *Within 30 days of surgery*     (   )Primary Care Clearance    (   ) Pulmonary Clearance    (   ) Cardiac Clearance     (   ) G.I. team for _____________________         (   ) OTHER : ________________________       You will need to bring a photo ID and insurance card the day of surgery.     Preadmission testing (PAT) will call prior to your surgery to complete your hospital interview/ questionnaire.     [facility] PAT phone number:  [phone]     [facility] PAT fax number:  [phone]    Please contact [facility] if you need to reschedule your surgery.   Office phone number:     [phone]  Fax number for [place]:    [phone]

## 2024-07-11 ENCOUNTER — OFFICE VISIT (OUTPATIENT)
Dept: SURGERY | Age: 44
End: 2024-07-11
Payer: COMMERCIAL

## 2024-07-11 VITALS
DIASTOLIC BLOOD PRESSURE: 83 MMHG | BODY MASS INDEX: 34.29 KG/M2 | WEIGHT: 239 LBS | SYSTOLIC BLOOD PRESSURE: 139 MMHG | HEART RATE: 79 BPM

## 2024-07-11 DIAGNOSIS — K21.9 GASTROESOPHAGEAL REFLUX DISEASE, UNSPECIFIED WHETHER ESOPHAGITIS PRESENT: Primary | ICD-10-CM

## 2024-07-11 DIAGNOSIS — K59.09 CHRONIC CONSTIPATION: ICD-10-CM

## 2024-07-11 DIAGNOSIS — K80.20 CALCULUS OF GALLBLADDER WITHOUT CHOLECYSTITIS WITHOUT OBSTRUCTION: ICD-10-CM

## 2024-07-11 PROCEDURE — 99213 OFFICE O/P EST LOW 20 MIN: CPT | Performed by: SURGERY

## 2024-07-11 NOTE — PROGRESS NOTES
Established Patient Visit    [facility]  [facility]   Santana Caldera MD    [address], [address]  [place], [place] [address]  Phone: [phone]  Fax: [phone]    Olivia Marin   YOB: 1980    Date of Visit:  7/11/2024    No ref. provider found  Pallavi Juarez MD    Subjective:     Olivia Marin presents for a follow-up of her GERD and constipation. Overall she has been doing well since her last visit.   She has been eating/drinking without difficulty.  Her nocturnal symptoms of reflux and emesis have resolved since being on a PPI BID.  She denies any significant abdominal pain.  Her bowel movements have become more frequent and are every other day.  She is only taking fiber one and stool softeners.         Allergies   Allergen Reactions    Oxycodone-Acetaminophen Nausea And Vomiting     Headache for 3 days    Amoxicillin Nausea And Vomiting    Augmentin [Amoxicillin-Pot Clavulanate] Nausea And Vomiting    Sulfa Antibiotics Nausea And Vomiting     No outpatient medications have been marked as taking for the 7/11/24 encounter (Office Visit) with Santana Caldera MD.       Vitals:    07/11/24 0943   BP: 139/83   Pulse: 79   Weight: 108.4 kg (239 lb)     Body mass index is 34.29 kg/m².     Wt Readings from Last 3 Encounters:   07/11/24 108.4 kg (239 lb)   05/30/24 104.3 kg (230 lb)   05/07/24 102 kg (224 lb 13.9 oz)     BP Readings from Last 3 Encounters:   07/11/24 139/83   05/30/24 (!) 141/103   05/07/24 136/81          Objective:    CONSTITUTIONAL:  awake, alert, no apparent distress    LUNGS:  Resp easy and unlabored  ABDOMEN:  soft, non-distended, non-tender, voluntary guarding absent, no masses palpated and hernia absent  MUSCULOSKELETAL: No edema  NEUROLOGIC:  Mental Status Exam:  Level of Alertness:   awake  Orientation:   person, place, time  SKIN: no erythema or induration      ASSESSMENT:     Diagnosis Orders   1.

## 2024-09-26 RX ORDER — FLUOXETINE 40 MG/1
40 CAPSULE ORAL DAILY
COMMUNITY

## 2024-09-26 RX ORDER — ESTRADIOL 0.05 MG/D
1 PATCH TRANSDERMAL
COMMUNITY

## 2024-09-26 NOTE — PROGRESS NOTES
Follow Up Prior to Surgery    DOS: 10/14/24  :1980      History and Physical: appointment with Dr. Kath Rios on 24 @ 0830-([facility] physician) [phone]

## 2024-09-26 NOTE — PROGRESS NOTES
[facility] Pre-Admission Testing Electronic Communication Worksheet for OR/ENDO Procedures        Patient: Olivia Marin    DOS: 10/14/24    Transportation Confirmed: [x] YES    []  NO     History and Physical:  [x] YES    []  NO  [] N/A  If yes, please list doctor or Urgent Care and date of H&P: Dr. Kath Rios-9/27/24    Additional Clearance(Cardiac, Pulmonary, etc):  [] YES    [x]  NO    Pre-Admission Testing Visit:  [] YES    [x]  NO If no, do labs/testing need to be done DOS?  [] YES    []  NO    Medication Reconciliation Complete:  [x] YES    []  NO        Additional Notes:                Interview Complete: [x] YES    []  NO          Demetria Nelson RN  10:41 AM

## 2024-09-26 NOTE — PROGRESS NOTES
[facility] PRE-OPERATIVE INSTRUCTIONS    Day of Procedure:   10/14/24             Arrival time:    1:20PM            Surgery time: 2:50PM    Take the following medications with a sip of water:  Follow your MD/Surgeons pre-procedure instructions regarding your medications     Do not eat or drink anything after 12:00 midnight prior to your surgery.  This includes water chewing gum, mints and ice chips.   You may brush your teeth and gargle the morning of your surgery, but do not swallow the water     Please see your family doctor/pediatrician for a history and physical and/or concerning medications.   Bring any test results/reports from your physicians office.   If you are under the care of a heart doctor or specialist doctor, please be aware that you may be asked to them for clearance    You may be asked to stop blood thinners such as Coumadin, Plavix, Fragmin, Lovenox, etc., or any anti-inflammatories such as:  Aspirin, Ibuprofen, Advil, Naproxen prior to your surgery.    We also ask that you stop any OTC medications such as fish oil, vitamin E, glucosamine, garlic, Multivitamins, COQ 10, etc. MAY TAKE TYLENOL    We ask that you do not smoke 24 hours prior to surgery  We ask that you do not  drink any alcoholic beverages 24 hours prior to surgery     You must make arrangements for a responsible adult to take you home after your surgery.    For your safety you will not be allowed to leave alone or drive yourself home.  Your surgery will be cancelled if you do not have a ride home.     Also for your safety, you must have someone stay with you the first 24 hours after your surgery.     A parent or legal guardian must accompany a child scheduled for surgery and plan to stay at the hospital until the child is discharged.    Please do not bring other children with you.    For your comfort, please wear simple loose fitting clothing to the hospital.  Please do not bring valuables.    Do not wear any make-up or

## 2024-10-01 NOTE — PROGRESS NOTES
Follow Up Prior to Surgery     DOS: 10/14/24  :1980        History and Physical: appointment with Dr. Kath Rios on 24 @ 0857-([facility] physician) [phone]                 Pt cleared in epic HPfrom

## 2024-10-02 ENCOUNTER — APPOINTMENT (OUTPATIENT)
Dept: GENERAL RADIOLOGY | Age: 44
End: 2024-10-02
Payer: COMMERCIAL

## 2024-10-02 ENCOUNTER — HOSPITAL ENCOUNTER (EMERGENCY)
Age: 44
Discharge: HOME | End: 2024-10-02
Attending: EMERGENCY MEDICINE
Payer: COMMERCIAL

## 2024-10-02 VITALS
OXYGEN SATURATION: 96 % | TEMPERATURE: 97.9 F | SYSTOLIC BLOOD PRESSURE: 153 MMHG | DIASTOLIC BLOOD PRESSURE: 93 MMHG | HEART RATE: 70 BPM | RESPIRATION RATE: 18 BRPM

## 2024-10-02 PROCEDURE — 99283 EMERGENCY DEPT VISIT LOW MDM: CPT

## 2024-10-02 PROCEDURE — 73590 X-RAY EXAM OF LOWER LEG: CPT

## 2024-10-02 RX ORDER — ORPHENADRINE CITRATE 100 MG/1
100 TABLET, EXTENDED RELEASE ORAL 2 TIMES DAILY
Qty: 20 TABLET | Refills: 0 | Status: SHIPPED | OUTPATIENT
Start: 2024-10-02 | End: 2024-10-12

## 2024-10-02 ASSESSMENT — PAIN SCALES - GENERAL: PAINLEVEL_OUTOF10: 9

## 2024-10-02 ASSESSMENT — PAIN DESCRIPTION - ORIENTATION: ORIENTATION: RIGHT

## 2024-10-02 ASSESSMENT — PAIN DESCRIPTION - LOCATION: LOCATION: LEG

## 2024-10-02 NOTE — ED NOTES
Provider order placed for patient's discharge. Provider reviewed decision to discharge with the patient. Discharge paperwork and any prescriptions were reviewed with the patient. Patient verbalized understanding of discharge education and any prescriptions and has no further questions or further needs at this time. Patient left with all personal belongings and was stable upon departure. Patient thanked for choosing [facility] and informed to return should any need arise.

## 2024-10-02 NOTE — ED PROVIDER NOTES
[facility]  EMERGENCY DEPARTMENTENCOUNTER      Pt Name: Olivia Marin  MRN: [id number]  Birthdate 1980  Date ofevaluation: 10/2/2024  Provider: Yunier Ann MD    CHIEF COMPLAINT       Chief Complaint   Patient presents with    Leg Pain       HPI    HISTORY OF PRESENT ILLNESS   (Location/Symptom, Timing/Onset,Context/Setting, Quality, Duration, Modifying Factors, Severity)  Note limiting factors.   Olivia Marin is a 43 y.o. female who presents to the emergency department with left lower leg pain.  This is a 43-year-old female who slipped at work injuring her left lower leg.  The patient states she did the scissors and when she slipped.  She now complains of pain of her left lower leg especially when she stands on it.  She denies any numbness or paresthesias.  This happened prior to arrival.        NursingNotes were reviewed.    Review of Systems    REVIEW OF SYSTEMS    (2-9 systems for level 4, 10 or more for level 5)     Review of Systems   Constitutional: Negative for fever.   HENT: Negative for rhinorrhea and sore throat.    Eyes: Negative for redness.   Respiratory: Negative for shortness of breath.    Cardiovascular: Negative for chest pain.   Gastrointestinal: Negative for abdominal pain.   Genitourinary: Negative for flank pain.   Neurological: Negative for headaches.   Hematological: Negative for adenopathy.   Psychiatric/Behavioral: Negative for confusion.              Except as noted above the remainder of the review of systems was reviewed and negative.       PAST MEDICAL HISTORY     Past Medical History:   Diagnosis Date    Depression     Endometriosis     GERD (gastroesophageal reflux disease)     History of adverse reaction to anesthesia     NAUSEA/VOMITTING AND HYSTERIA    PONV (postoperative nausea and vomiting)     LAST SURGERY WITH DR. HERNÁNDEZ PATIENT SAID SHE THREW UP DURING PROCEDURE-AT [place] LOCATION    Prolonged emergence from general

## 2024-10-02 NOTE — DISCHARGE INSTRUCTIONS
Take Norflex for your pain.  Use crutches for relief.  I have referred you to orthopedic surgery and should call them today for an appointment in the next several days.  Return for any other emergencies.

## 2024-10-02 NOTE — ED TRIAGE NOTES
Patient presents to ED for c/o slipping on water and work ([employer]) and falling. Patient with c/o pain to the top of her right foot up her right leg. Patient states she saw podiatry yesterday for plantar fasciitis and achilles pain in which she has a brace on. Patient states she took 800mg of ibuprofen this morning and 1000mg of tylenol PTA. Patient with c/o 9/10 pain.

## 2024-10-10 ENCOUNTER — ANESTHESIA EVENT (OUTPATIENT)
Dept: OPERATING ROOM | Age: 44
End: 2024-10-10
Payer: COMMERCIAL

## 2024-10-10 ENCOUNTER — TELEPHONE (OUTPATIENT)
Dept: ORTHOPEDIC SURGERY | Age: 44
End: 2024-10-10

## 2024-10-10 ENCOUNTER — OFFICE VISIT (OUTPATIENT)
Dept: ORTHOPEDIC SURGERY | Age: 44
End: 2024-10-10

## 2024-10-10 VITALS — BODY MASS INDEX: 33.64 KG/M2 | HEIGHT: 70 IN | WEIGHT: 235 LBS

## 2024-10-10 DIAGNOSIS — S93.401A SPRAIN OF RIGHT ANKLE, UNSPECIFIED LIGAMENT, INITIAL ENCOUNTER: Primary | ICD-10-CM

## 2024-10-10 DIAGNOSIS — T14.8XXA CONTUSION OF BONE: ICD-10-CM

## 2024-10-10 SDOH — HEALTH STABILITY: PHYSICAL HEALTH
ON AVERAGE, HOW MANY DAYS PER WEEK DO YOU ENGAGE IN MODERATE TO STRENUOUS EXERCISE (LIKE A BRISK WALK)?: PATIENT DECLINED

## 2024-10-10 NOTE — PROGRESS NOTES
Olivia HINDS [facility]  [id number]  October 10, 2024    Chief Complaint   Patient presents with    New Patient     R ankle        History: The patient is a 43-year-old female who is here for evaluation of her right leg.  The patient is a  at [employer].  She reportedly slipped on some grease on a floor and her right foot and leg went behind her.  Her left leg kicked out in front of her.  She felt and heard a pop in the leg.  This is a Worker's Compensation case.  The injury occurred on 10/2.  She ultimately went to the emergency room on 10/2.  She has been using a scooter.  She has difficulty bearing weight.  It should be noted she has been dealing with bilateral plantar fasciitis and her podiatrist gave her a Medrol Dosepak.  She was dealing with the plantar fasciitis for quite some time prior to the injury.  She finished a Medrol Dosepak on Sunday and this seemed to help with the plantar fasciitis.  It should be noted she is having some bladder surgery on Monday.  She has been wearing a lace up ankle brace for the right without much benefit.    The patient's  past medical history, medications, allergies,  family history, social history, and have been reviewed, and dated and are recorded in the chart.  Pertinent items are noted in HPI.  Review of systems reviewed from Pertinent History Form dated on 10/10 and available in the patient's chart under the Media tab.     Vitals:  Ht 1.778 m (5' 10\")   Wt 106.6 kg (235 lb)   LMP 11/02/2023 (Exact Date)   BMI 33.72 kg/m²     Physical: On examination today, the patient is alert and oriented x 3.  The patient has diffuse pretibial tenderness over the right leg just proximal to the ankle.  She has moderate swelling in the right leg.  She is nontender over the medial malleolus.  She is nontender over the fibula.  She is able to lightly dorsiflex and plantarflex the right ankle without difficulty.  She has mild tenderness to palpation over the Achilles insertion.  She is

## 2024-10-10 NOTE — TELEPHONE ENCOUNTER
Please submit a C9 for a tall boot () provided in office.    Please add diagnosis of right ankle sprain S93.401A and bone contusion T14.8XXA to claim.

## 2024-10-14 ENCOUNTER — ANESTHESIA (OUTPATIENT)
Dept: OPERATING ROOM | Age: 44
End: 2024-10-14
Payer: COMMERCIAL

## 2024-10-14 ENCOUNTER — HOSPITAL ENCOUNTER (OUTPATIENT)
Age: 44
Setting detail: OUTPATIENT SURGERY
Discharge: HOME | End: 2024-10-14
Attending: UROLOGY | Admitting: UROLOGY
Payer: COMMERCIAL

## 2024-10-14 VITALS
TEMPERATURE: 97 F | BODY MASS INDEX: 33.61 KG/M2 | DIASTOLIC BLOOD PRESSURE: 90 MMHG | WEIGHT: 234.79 LBS | HEART RATE: 60 BPM | RESPIRATION RATE: 16 BRPM | SYSTOLIC BLOOD PRESSURE: 132 MMHG | HEIGHT: 70 IN | OXYGEN SATURATION: 97 %

## 2024-10-14 PROCEDURE — 6370000000 HC RX 637 (ALT 250 FOR IP): Performed by: ANESTHESIOLOGY

## 2024-10-14 PROCEDURE — 2709999900 HC NON-CHARGEABLE SUPPLY: Performed by: UROLOGY

## 2024-10-14 PROCEDURE — 2580000003 HC RX 258: Performed by: ANESTHESIOLOGY

## 2024-10-14 PROCEDURE — L8606 SYNTHETIC IMPLNT URINARY 1ML: HCPCS | Performed by: UROLOGY

## 2024-10-14 PROCEDURE — 7100000000 HC PACU RECOVERY - FIRST 15 MIN: Performed by: UROLOGY

## 2024-10-14 PROCEDURE — 3700000001 HC ADD 15 MINUTES (ANESTHESIA): Performed by: UROLOGY

## 2024-10-14 PROCEDURE — 2500000003 HC RX 250 WO HCPCS: Performed by: ANESTHESIOLOGY

## 2024-10-14 PROCEDURE — 3600000002 HC SURGERY LEVEL 2 BASE: Performed by: UROLOGY

## 2024-10-14 PROCEDURE — 2580000003 HC RX 258: Performed by: UROLOGY

## 2024-10-14 PROCEDURE — 7100000010 HC PHASE II RECOVERY - FIRST 15 MIN: Performed by: UROLOGY

## 2024-10-14 PROCEDURE — 6360000002 HC RX W HCPCS: Performed by: UROLOGY

## 2024-10-14 PROCEDURE — 6360000002 HC RX W HCPCS

## 2024-10-14 PROCEDURE — 3600000012 HC SURGERY LEVEL 2 ADDTL 15MIN: Performed by: UROLOGY

## 2024-10-14 PROCEDURE — 6360000002 HC RX W HCPCS: Performed by: ANESTHESIOLOGY

## 2024-10-14 PROCEDURE — 7100000001 HC PACU RECOVERY - ADDTL 15 MIN: Performed by: UROLOGY

## 2024-10-14 PROCEDURE — 2500000003 HC RX 250 WO HCPCS

## 2024-10-14 PROCEDURE — 3700000000 HC ANESTHESIA ATTENDED CARE: Performed by: UROLOGY

## 2024-10-14 PROCEDURE — 7100000011 HC PHASE II RECOVERY - ADDTL 15 MIN: Performed by: UROLOGY

## 2024-10-14 PROCEDURE — 2580000003 HC RX 258

## 2024-10-14 DEVICE — BULKAMID URETHRAL BULKING SYSTEM 2ML
Type: IMPLANTABLE DEVICE | Site: BLADDER | Status: FUNCTIONAL
Brand: BULKAMID

## 2024-10-14 RX ORDER — MIDAZOLAM HYDROCHLORIDE 1 MG/ML
INJECTION INTRAMUSCULAR; INTRAVENOUS
Status: DISCONTINUED | OUTPATIENT
Start: 2024-10-14 | End: 2024-10-14 | Stop reason: SDUPTHER

## 2024-10-14 RX ORDER — METOCLOPRAMIDE HYDROCHLORIDE 5 MG/ML
10 INJECTION INTRAMUSCULAR; INTRAVENOUS ONCE
Status: COMPLETED | OUTPATIENT
Start: 2024-10-14 | End: 2024-10-14

## 2024-10-14 RX ORDER — FENTANYL CITRATE 0.05 MG/ML
25 INJECTION, SOLUTION INTRAMUSCULAR; INTRAVENOUS EVERY 5 MIN PRN
Status: DISCONTINUED | OUTPATIENT
Start: 2024-10-14 | End: 2024-10-14 | Stop reason: HOSPADM

## 2024-10-14 RX ORDER — MEPERIDINE HYDROCHLORIDE 25 MG/ML
12.5 INJECTION INTRAMUSCULAR; INTRAVENOUS; SUBCUTANEOUS EVERY 5 MIN PRN
Status: DISCONTINUED | OUTPATIENT
Start: 2024-10-14 | End: 2024-10-14 | Stop reason: HOSPADM

## 2024-10-14 RX ORDER — IPRATROPIUM BROMIDE AND ALBUTEROL SULFATE 2.5; .5 MG/3ML; MG/3ML
1 SOLUTION RESPIRATORY (INHALATION)
Status: DISCONTINUED | OUTPATIENT
Start: 2024-10-14 | End: 2024-10-14 | Stop reason: HOSPADM

## 2024-10-14 RX ORDER — SODIUM CHLORIDE 0.9 % (FLUSH) 0.9 %
5-40 SYRINGE (ML) INJECTION EVERY 12 HOURS SCHEDULED
Status: DISCONTINUED | OUTPATIENT
Start: 2024-10-14 | End: 2024-10-14 | Stop reason: HOSPADM

## 2024-10-14 RX ORDER — SODIUM CHLORIDE 9 MG/ML
INJECTION, SOLUTION INTRAVENOUS
Status: DISCONTINUED | OUTPATIENT
Start: 2024-10-14 | End: 2024-10-14 | Stop reason: SDUPTHER

## 2024-10-14 RX ORDER — NALOXONE HYDROCHLORIDE 0.4 MG/ML
INJECTION, SOLUTION INTRAMUSCULAR; INTRAVENOUS; SUBCUTANEOUS PRN
Status: DISCONTINUED | OUTPATIENT
Start: 2024-10-14 | End: 2024-10-14 | Stop reason: HOSPADM

## 2024-10-14 RX ORDER — PROPOFOL 10 MG/ML
INJECTION, EMULSION INTRAVENOUS
Status: DISCONTINUED | OUTPATIENT
Start: 2024-10-14 | End: 2024-10-14 | Stop reason: SDUPTHER

## 2024-10-14 RX ORDER — LEVOFLOXACIN 5 MG/ML
500 INJECTION, SOLUTION INTRAVENOUS ONCE
Status: COMPLETED | OUTPATIENT
Start: 2024-10-14 | End: 2024-10-14

## 2024-10-14 RX ORDER — DROPERIDOL 2.5 MG/ML
0.62 INJECTION, SOLUTION INTRAMUSCULAR; INTRAVENOUS
Status: DISCONTINUED | OUTPATIENT
Start: 2024-10-14 | End: 2024-10-14 | Stop reason: HOSPADM

## 2024-10-14 RX ORDER — SODIUM CHLORIDE 0.9 % (FLUSH) 0.9 %
5-40 SYRINGE (ML) INJECTION PRN
Status: DISCONTINUED | OUTPATIENT
Start: 2024-10-14 | End: 2024-10-14 | Stop reason: HOSPADM

## 2024-10-14 RX ORDER — FENTANYL CITRATE 50 UG/ML
INJECTION, SOLUTION INTRAMUSCULAR; INTRAVENOUS
Status: DISCONTINUED | OUTPATIENT
Start: 2024-10-14 | End: 2024-10-14 | Stop reason: SDUPTHER

## 2024-10-14 RX ORDER — SCOLOPAMINE TRANSDERMAL SYSTEM 1 MG/1
1 PATCH, EXTENDED RELEASE TRANSDERMAL ONCE
Status: DISCONTINUED | OUTPATIENT
Start: 2024-10-14 | End: 2024-10-14 | Stop reason: HOSPADM

## 2024-10-14 RX ORDER — SODIUM CHLORIDE 9 MG/ML
INJECTION, SOLUTION INTRAVENOUS PRN
Status: DISCONTINUED | OUTPATIENT
Start: 2024-10-14 | End: 2024-10-14 | Stop reason: HOSPADM

## 2024-10-14 RX ORDER — ONDANSETRON 2 MG/ML
4 INJECTION INTRAMUSCULAR; INTRAVENOUS
Status: DISCONTINUED | OUTPATIENT
Start: 2024-10-14 | End: 2024-10-14 | Stop reason: HOSPADM

## 2024-10-14 RX ORDER — OXYCODONE HYDROCHLORIDE 5 MG/1
5 TABLET ORAL
Status: DISCONTINUED | OUTPATIENT
Start: 2024-10-14 | End: 2024-10-14 | Stop reason: HOSPADM

## 2024-10-14 RX ORDER — LIDOCAINE HYDROCHLORIDE 20 MG/ML
INJECTION, SOLUTION EPIDURAL; INFILTRATION; INTRACAUDAL; PERINEURAL
Status: DISCONTINUED | OUTPATIENT
Start: 2024-10-14 | End: 2024-10-14 | Stop reason: SDUPTHER

## 2024-10-14 RX ADMIN — FENTANYL CITRATE 50 MCG: 50 INJECTION INTRAMUSCULAR; INTRAVENOUS at 14:32

## 2024-10-14 RX ADMIN — LEVOFLOXACIN 500 MG: 5 INJECTION, SOLUTION INTRAVENOUS at 14:26

## 2024-10-14 RX ADMIN — PROPOFOL 80 MG: 10 INJECTION, EMULSION INTRAVENOUS at 14:30

## 2024-10-14 RX ADMIN — METOCLOPRAMIDE 10 MG: 5 INJECTION, SOLUTION INTRAMUSCULAR; INTRAVENOUS at 14:12

## 2024-10-14 RX ADMIN — LIDOCAINE HYDROCHLORIDE 100 MG: 20 INJECTION, SOLUTION EPIDURAL; INFILTRATION; INTRACAUDAL; PERINEURAL at 14:30

## 2024-10-14 RX ADMIN — FENTANYL CITRATE 50 MCG: 50 INJECTION INTRAMUSCULAR; INTRAVENOUS at 14:28

## 2024-10-14 RX ADMIN — PROPOFOL 150 MCG/KG/MIN: 10 INJECTION, EMULSION INTRAVENOUS at 14:31

## 2024-10-14 RX ADMIN — SODIUM CHLORIDE: 9 INJECTION, SOLUTION INTRAVENOUS at 14:26

## 2024-10-14 RX ADMIN — FAMOTIDINE 20 MG: 10 INJECTION, SOLUTION INTRAVENOUS at 14:11

## 2024-10-14 RX ADMIN — MIDAZOLAM 2 MG: 1 INJECTION INTRAMUSCULAR; INTRAVENOUS at 14:26

## 2024-10-14 ASSESSMENT — PAIN - FUNCTIONAL ASSESSMENT
PAIN_FUNCTIONAL_ASSESSMENT: NONE - DENIES PAIN

## 2024-10-14 NOTE — FLOWSHEET NOTE
Pt. Arrived in phase 2.  Drowsy, able to answer questions.  Denies pain.  Does not feel like she needs to urinate at this time.

## 2024-10-14 NOTE — INTERVAL H&P NOTE
Update History & Physical    The patient's History and Physical  was reviewed with the patient and I examined the patient. There was no change. The surgical site was confirmed by the patient and me.     Plan: The risks, benefits, expected outcome, and alternative to the recommended procedure have been discussed with the patient. Patient understands and wants to proceed with the procedure.     Electronically signed by Aruna Galvin MD on 10/14/2024 at 2:44 PM      
[] : results reviewed

## 2024-10-14 NOTE — FLOWSHEET NOTE
Pt. Tried again to void.  Unable to.  Bladder scanned for 12 cc urine.  Dr. Galvin updated.  She said that pt's bladder was emptied during the procedure.  She does NOT want her to get any more IV fluids.  Pt. Got 1000cc NS.  She wants her to keep drinking fluid.  Pt. Informed.

## 2024-10-14 NOTE — PROGRESS NOTES
Pt resting comfortably in bed with eyes closed, opens eyes spontaneously to voice, denies pain or nausea at this time, states is just tired but is ready to move to phase 2 to see her family. Vss. Abdo still soft to touch, no pain with palpation. No signs of distress noted at this time.

## 2024-10-14 NOTE — PROGRESS NOTES
Pt now awake and talking, groggy. Denies pain or nausea at this time, oriented to pacu, falls back to sleep easily. Vss.

## 2024-10-14 NOTE — ANESTHESIA PRE PROCEDURE
Department of Anesthesiology  Preprocedure Note       Name:  Olivia Marin   Age:  43 y.o.  :  1980                                          MRN:  [id number]         Date:  10/14/2024      Surgeon: Surgeon(s):  Aruna Galvin MD    Procedure: Procedure(s):  CYSTOSCOPY WITH INJECTION OF URETHRAL BULKING AGENT-BULKAMID    Medications prior to admission:   Prior to Admission medications    Medication Sig Start Date End Date Taking? Authorizing Provider   estradiol (CLIMARA) 0.05 MG/24HR Place 1 patch onto the skin Twice a Week   Yes Richelle Amato MD   FLUoxetine (PROZAC) 40 MG capsule Take 1 capsule by mouth daily TAKES WITH 20 MG TO =60MG   Yes Richelle Amato MD   FLUoxetine (PROZAC) 20 MG capsule Take 1 capsule by mouth daily TAKES WITH 40MG TO =60MG   Yes Richelle Amato MD   Vitamin D-Vitamin K (VITAMIN K2-VITAMIN D3 PO) Take 1 tablet by mouth daily   Yes Richelle Amato MD   pantoprazole (PROTONIX) 40 MG tablet Take 1 tablet by mouth in the morning and at bedtime 24  Yes Santana Caldera MD   ibuprofen (ADVIL;MOTRIN) 800 MG tablet Take 1 tablet by mouth every 8 hours as needed for Pain 23  Yes Velia Amanda MD       Current medications:    Current Facility-Administered Medications   Medication Dose Route Frequency Provider Last Rate Last Admin   • levoFLOXacin (LEVAQUIN) 500 MG/100ML infusion 500 mg  500 mg IntraVENous Once Aruna Galvin MD       • sodium chloride flush 0.9 % injection 5-40 mL  5-40 mL IntraVENous 2 times per day Juan José Pardo MD       • sodium chloride flush 0.9 % injection 5-40 mL  5-40 mL IntraVENous PRN Juan José Pardo MD       • ipratropium 0.5 mg-albuterol 2.5 mg (DUONEB) nebulizer solution 1 Dose  1 Dose Inhalation Once PRN Juan José Pardo MD       • scopolamine (TRANSDERM-SCOP) transdermal patch 1 patch  1 patch TransDERmal Once Jay Chavez MD   1 patch at 10/14/24 1411       Allergies:

## 2024-10-14 NOTE — ANESTHESIA POSTPROCEDURE EVALUATION
Department of Anesthesiology  Postprocedure Note    Patient: Olivia Marin  MRN: [id number]  YOB: 1980  Date of evaluation: 10/14/2024    Procedure Summary       Date: 10/14/24 Room / Location: [address]    Anesthesia Start: 1426 Anesthesia Stop: 1449    Procedure: CYSTOSCOPY WITH INJECTION OF URETHRAL BULKING AGENT-BULKAMID (Bladder) Diagnosis:       Intrinsic sphincter deficiency (ISD)      Female stress incontinence      (Intrinsic sphincter deficiency (ISD) [N36.42])      (Female stress incontinence [N39.3])    Surgeons: Aruna Galvin MD Responsible Provider: Jay Chavez MD    Anesthesia Type: MAC ASA Status: 2            Anesthesia Type: No value filed.    Kaylene Phase I: Kaylene Score: 10    Kaylene Phase II: Kaylene Score: 10    Anesthesia Post Evaluation    Patient location during evaluation: PACU  Patient participation: complete - patient participated  Level of consciousness: awake  Airway patency: patent  Nausea & Vomiting: no nausea and no vomiting  Cardiovascular status: blood pressure returned to baseline  Respiratory status: acceptable  Hydration status: stable  Comments: Vital signs stable  OK to discharge from Stage I post anesthesia care.  Care transferred from Anesthesiology department on discharge from perioperative area   Multimodal analgesia pain management approach  Pain management: satisfactory to patient    No notable events documented.

## 2024-10-14 NOTE — PROGRESS NOTES
Pt to pacu from OR. Pt asleep at arrival, on RA. Placed on monitor, vss. Abdo soft to touch, no signs of distress noted at this time, report obtained.

## 2024-10-14 NOTE — FLOWSHEET NOTE
Pt. Unable to void.  Sitting in chair in room.  Drinking apple juice.  IV NS wide open.  Scope patch behind ear.  Pt. Denies nausea.

## 2024-10-14 NOTE — BRIEF OP NOTE
Brief Postoperative Note      Patient: Olivia Marin  YOB: 1980  MRN: [id number]    Date of Procedure: 10/14/2024    Pre-Op Diagnosis Codes:      * Intrinsic sphincter deficiency (ISD) [N36.42]     * Female stress incontinence [N39.3]    Post-Op Diagnosis: Same       Procedure(s):  CYSTOSCOPY WITH INJECTION OF URETHRAL BULKING AGENT-BULKAMID    Surgeon(s):  Aruna Galvin MD    Assistant:  Surgical Assistant: Deann Huber    Anesthesia: Monitor Anesthesia Care    Estimated Blood Loss (mL): Minimal    Complications: None    Specimens:   * No specimens in log *    Implants:  Implant Name Type Inv. Item Serial No.  Lot No. LRB No. Used Action   SYSTEM BULKING PRC BULKAMID URETHRAL - [id number]  SYSTEM BULKING PRC BULKAMID URETHRAL  [employer] [id number] N/A 2 Implanted         Drains: * No LDAs found *    Findings:  Infection Present At Time Of Surgery (PATOS) (choose all levels that have infection present):  No infection present  Other Findings: normal cysto    Electronically signed by Aruna Galvin MD on 10/14/2024 at 2:45 PM

## 2024-10-14 NOTE — FLOWSHEET NOTE
Pt. Up to BR to void.   helping her walk.  Boot placed on pt's right foot so she could walk. History of recent right ankle sprain.

## 2024-10-15 ENCOUNTER — TELEPHONE (OUTPATIENT)
Dept: ORTHOPEDIC SURGERY | Age: 44
End: 2024-10-15

## 2024-10-15 NOTE — TELEPHONE ENCOUNTER
[employer] / WORKABILITY:    Caller:FANG CALDWELL     Phone#: [phone]    Fax#: [phone]    [employer] Date of Service:  10.10.2024    REASON FOR CALL:         WORKABILITY NOTE Issues    PER CASE  [place] WORK[employer] NOTE MUST HAVE RESTRICTIONS, THEY DO NOT ACCEPT COMPLETED OFF WORK NOTES.    PLEASE FAX ONCE COMPLETED

## 2024-10-15 NOTE — TELEPHONE ENCOUNTER
Lakisha called back stating the employer has light duty \"sit down as needed\" and therefore [facility] cannot accept an off work note. I informed Lakisha that the patient cannot return with \"sit down as needed\". She could return with restrictions of \"sit down only\". Lakisha asked for a note to be faxed to her at [phone] and she will present this to the employer.    Work note was faxed.

## 2024-10-15 NOTE — OP NOTE
[facility]          [address] [place] [address]                            OPERATIVE REPORT      PATIENT NAME: SASHA ALLEN             : 1980  MED REC NO: [id number]                      ROOM: OR  ACCOUNT NO: [id number]                       ADMIT DATE: 10/14/2024  PROVIDER: Aruna Galvin MD      DATE OF PROCEDURE:  10/14/2024    SURGEON:  Aruna Galvin MD    PREOPERATIVE DIAGNOSIS:  Stress urinary incontinence with intrinsic sphincter deficiency.    POSTOPERATIVE DIAGNOSIS:  Stress urinary incontinence with intrinsic sphincter deficiency.    PROCEDURES:  Cystoscopy, injection of urethral bulking agent.    ANESTHESIA:  TIVA.    COMPLICATIONS:  None.    BLOOD LOSS:  Minimal.    INDICATIONS:  43-year-old female with a complex urologic history.  She had a hysterectomy and sling placement last year and subsequently developed a vesicovaginal fistula.  Prior to repair of the vesicovaginal fistula, the sling had been taken down.  She has now healed from the fistula repair, but she has persistent although occasional stress incontinence.  She is here for bulking agent injection.    PROCEDURE DETAILS:  She is given antibiotics and taken to the cystoscopy suite.  She moved onto the table.  Anesthesia was induced.  Her position was changed to the lithotomy position.  Her genitalia were prepped and draped.  The 21-Faroese scope advanced easily through the urethra into the bladder.  There were no urethral abnormalities.  The bladder was also normal.  Both ureteral orifices were normal.  The bladder was drained and the cystoscope was withdrawn.  The Bulkamid scope was inserted.  I withdrew the scope to the level of the mid urethra about 2 cm back from the bladder neck.  A 0.5 mL of Bulkamid was injected at the 7 o'clock position followed by 0.5 mL at the 5 o'clock position.  This led to quite good coaptation.  I elected to inject one more

## 2024-10-15 NOTE — TELEPHONE ENCOUNTER
I left a message to inform Lakisha the patient is to be off work per Dr. Avila until 10/18/24. She will then return full duty, wearing her tall boot. This is on the [employer] that was completed the day of her appointment and work comp department should have faxed.

## 2024-10-24 ENCOUNTER — OFFICE VISIT (OUTPATIENT)
Dept: ORTHOPEDIC SURGERY | Age: 44
End: 2024-10-24
Payer: COMMERCIAL

## 2024-10-24 VITALS — HEIGHT: 70 IN | WEIGHT: 234 LBS | BODY MASS INDEX: 33.5 KG/M2

## 2024-10-24 DIAGNOSIS — T14.8XXA CONTUSION OF BONE: ICD-10-CM

## 2024-10-24 DIAGNOSIS — S93.401A SPRAIN OF RIGHT ANKLE, UNSPECIFIED LIGAMENT, INITIAL ENCOUNTER: Primary | ICD-10-CM

## 2024-10-24 PROCEDURE — 99213 OFFICE O/P EST LOW 20 MIN: CPT | Performed by: ORTHOPAEDIC SURGERY

## 2024-10-24 RX ORDER — MELOXICAM 15 MG/1
15 TABLET ORAL DAILY
COMMUNITY
Start: 2024-10-01 | End: 2024-10-31

## 2024-10-24 RX ORDER — ACETAMINOPHEN 500 MG
500 TABLET ORAL EVERY 6 HOURS PRN
COMMUNITY

## 2024-10-24 NOTE — PROGRESS NOTES
right tibia bone bruise/contusion    Plan: At this time, the patient may continue with the boot for 1 more week.  She was encouraged to work on range of motion and strengthening.  She will ice and elevate is much as possible.  She can continue to work regular duty.  She will continue taking the meloxicam.  The patient will follow-up with me in 3 weeks and we will reassess her then.     No orders of the defined types were placed in this encounter.

## 2024-11-19 ENCOUNTER — OFFICE VISIT (OUTPATIENT)
Dept: ORTHOPEDIC SURGERY | Age: 44
End: 2024-11-19

## 2024-11-19 VITALS — BODY MASS INDEX: 33.5 KG/M2 | WEIGHT: 234 LBS | HEIGHT: 70 IN

## 2024-11-19 DIAGNOSIS — S93.401A SPRAIN OF RIGHT ANKLE, UNSPECIFIED LIGAMENT, INITIAL ENCOUNTER: Primary | ICD-10-CM

## 2024-11-19 DIAGNOSIS — T14.8XXA CONTUSION OF BONE: ICD-10-CM

## 2024-11-19 NOTE — PROGRESS NOTES
[facility]IN[facility]  [id number]  November 19, 2024    Chief Complaint   Patient presents with    Follow-up       [facility], doi 10/2/24. Right ankle sprain, right tibia bone bruise/contusion.           History: The patient is a 43-year-old female who is here for evaluation of her right leg.  The patient is a  at [employer].  She reportedly slipped on some grease on a floor and her right foot and leg went behind her.  Her left leg kicked out in front of her.  She felt and heard a pop in the leg.  This is a Worker's Compensation case.  The injury occurred on 10/2.  The patient has completely discontinued the boot.  She is feeling much better.  She has been working full duty.  She does occasionally have pain with aggressive activities.      The patient's  past medical history, medications, allergies,  family history, social history, and have been reviewed, and dated and are recorded in the chart.  Pertinent items are noted in HPI.  Review of systems reviewed from Pertinent History Form dated on 10/10 and available in the patient's chart under the Media tab.     Vitals:  Ht 1.778 m (5' 10\")   Wt 106.1 kg (234 lb)   LMP 11/02/2023 (Exact Date)   BMI 33.58 kg/m²     Physical: On examination today, the patient is alert and oriented x 3.  The patient has mild diffuse pretibial tenderness over the right leg just proximal to the ankle.  She has no swelling in the right leg.  She is nontender over the medial malleolus.  She is nontender over the fibula.  She is able to lightly dorsiflex and plantarflex the right ankle without difficulty.  She has no tenderness to palpation over the Achilles insertion.  She is nontender over the plantar medial aspect of the heel.  Examination of the skin reveals no lesions or ulcerations.  She is neurovascularly intact.    X-rays: 2 views of the right tib-fib obtained on 10/2 were extensively reviewed.  The x-rays were negative.  There is no evidence of fracture or dislocation    Impression: Right

## (undated) DEVICE — DECANTER BTL 6IN: Brand: MEDLINE INDUSTRIES, INC.

## (undated) DEVICE — CATHETER URETH 18FR 2CC BLLN SIL ELASTMR F 2 W BARDX

## (undated) DEVICE — GLOVE SURG SZ 65 CRM LTX FREE POLYISOPRENE POLYMER BEAD ANTI

## (undated) DEVICE — SEALER REPROC VES 37 CM MARYLAND JAW LIGASURE

## (undated) DEVICE — PAD PT POS 36 IN SURGYPAD DISP

## (undated) DEVICE — GLOVE SURG SZ 85 L12IN FNGR ORTHO 126MIL CRM LTX FREE

## (undated) DEVICE — SUTURE VCRL + SZ 4-0 L27IN ABSRB UD PS-2 3/8 CIR REV CUT VCP426H

## (undated) DEVICE — DRAINBAG,ANTI-REFLUX TOWER,L/F,2000ML,LL: Brand: MEDLINE

## (undated) DEVICE — PAD SANITARY MTRN TAB BELT WRP NS 11IN

## (undated) DEVICE — SUTURE VICRYL + SZ 3-0 L27IN ABSRB VLT SH 1/2 CIR TAPR PNT VCP784D

## (undated) DEVICE — MASTISOL ADHESIVE LIQ 2/3ML

## (undated) DEVICE — MERCY HEALTH WEST TURNOVER: Brand: MEDLINE INDUSTRIES, INC.

## (undated) DEVICE — FS-30 DEVICE - (30MM CERVICAL CUP): Brand: MCCARUS-VOLKER FORNISEE® SYSTEM

## (undated) DEVICE — SOLUTION PREP PAINT POV IOD FOR SKIN MUCOUS MEM

## (undated) DEVICE — SOLUTION SCRB 4OZ 7.5% POVIDONE IOD ANTIMIC BTL

## (undated) DEVICE — SYRINGE MED 30ML STD CLR PLAS LUERLOCK TIP N CTRL DISP

## (undated) DEVICE — SYRINGE MED 10ML TRNSLUC BRL PLUNG BLK MRK POLYPR CTRL

## (undated) DEVICE — TOWEL,OR,DSP,ST,BLUE,STD,4/PK,20PK/CS: Brand: MEDLINE

## (undated) DEVICE — NEEDLE HYPO 23GA L1.5IN TURQ POLYPR HUB S STL THN WALL IM

## (undated) DEVICE — SOLUTION IRRIG 1000ML STRL H2O USP PLAS POUR BTL

## (undated) DEVICE — TIP COVER ACCESSORY

## (undated) DEVICE — SOLUTION IRRIGATION STRL H2O 1000 ML UROMATIC CONTAINER

## (undated) DEVICE — SYRINGE IRRIG 60ML SFT PLIABLE BLB EZ TO GRP 1 HND USE W/

## (undated) DEVICE — SYRINGE MED 10ML LUERLOCK TIP W/O SFTY DISP

## (undated) DEVICE — CYSTOSCOPY: Brand: MEDLINE INDUSTRIES, INC.

## (undated) DEVICE — BASIC SINGLE BASIN 1-LF: Brand: MEDLINE INDUSTRIES, INC.

## (undated) DEVICE — 1016 S-DRAPE IRRIG POUCH 10/BOX: Brand: STERI-DRAPE™

## (undated) DEVICE — BLADELESS OBTURATOR: Brand: WECK VISTA

## (undated) DEVICE — GOWN SIRUS NONREIN XL W/TWL: Brand: MEDLINE INDUSTRIES, INC.

## (undated) DEVICE — CATHETER URETH 14FR L16IN RED RUB INTMIT ROB MOD BARDX

## (undated) DEVICE — DRAPE LAP 104X35X124IN BLU CTRL + FAB REINF ABD FEN

## (undated) DEVICE — SUTURE VCRL + SZ 4-0 L27IN ABSRB WHT FS-2 3/8 CIR REV CUT VCP422H

## (undated) DEVICE — DRAPE,UNDERBUTTOCKS,PCH,STERILE: Brand: MEDLINE

## (undated) DEVICE — 3M™ STERI-STRIP™ COMPOUND BENZOIN TINCTURE 40 BAGS/CARTON 4 CARTONS/CASE C1544: Brand: 3M™ STERI-STRIP™

## (undated) DEVICE — SUTURE DEV SZ 2-0 WND CLSR ABSRB GS-22 VLOC COVIDIEN VLOCM2145

## (undated) DEVICE — SUTURE VICRYL + SZ 2-0 L27IN ABSRB WHT SH 1/2 CIR TAPERCUT VCP417H

## (undated) DEVICE — ROBOTIC GYN: Brand: MEDLINE INDUSTRIES, INC.

## (undated) DEVICE — SKIN MARKER REGULAR TIP WITH RULER CAP AND LABELS: Brand: DEVON

## (undated) DEVICE — LIQUIBAND RAPID ADHESIVE 36/CS 0.8ML: Brand: MEDLINE

## (undated) DEVICE — SOLUTION IRRIG 1000ML 0.9% SOD CHL USP POUR PLAS BTL

## (undated) DEVICE — TUBING, SUCTION, 1/4" X 12', STRAIGHT: Brand: MEDLINE

## (undated) DEVICE — MAJOR SET UP: Brand: MEDLINE INDUSTRIES, INC.

## (undated) DEVICE — SUTURE VCRL + SZ 3-0 L27IN ABSRB UD L26MM SH 1/2 CIR VCP416H

## (undated) DEVICE — SYRINGE 20ML LL S/C 50

## (undated) DEVICE — INSUFFLATION NEEDLE TO ESTABLISH PNEUMOPERITONEUM.: Brand: INSUFFLATION NEEDLE

## (undated) DEVICE — ELECTRODE PT RET AD L9FT HI MOIST COND ADH HYDRGEL CORDED

## (undated) DEVICE — SEAL

## (undated) DEVICE — CANNULA SEAL

## (undated) DEVICE — GOWN SIRUS NONREIN LG W/TWL: Brand: MEDLINE INDUSTRIES, INC.

## (undated) DEVICE — PUMP SUC IRR TBNG L10FT W/ HNDPC ASSEMB STRYKEFLOW 2

## (undated) DEVICE — SUTURE PROL SZ 2-0 L30IN NONABSORBABLE BLU L26MM SH 1/2 CIR 8833H

## (undated) DEVICE — Y-TYPE TUR/BLADDER IRRIGATION SET, REGULATING CLAMP

## (undated) DEVICE — LEGGINGS, PAIR, CLEAR, STERILE: Brand: MEDLINE

## (undated) DEVICE — CYSTO/BLADDER IRRIGATION SET, REGULATING CLAMP

## (undated) DEVICE — APPLICATOR SURG XL L38CM FOR ARISTA ABSRB HEMSTAT FLEXITIP

## (undated) DEVICE — SEALER LAP L37CM MARYLAND JAW OPN NANO COAT MULTIFUNCTIONAL

## (undated) DEVICE — PREMIUM DRY TRAY LF: Brand: MEDLINE INDUSTRIES, INC.

## (undated) DEVICE — SUTURE VICRYL + SZ 3-0 L27IN ABSRB UD L26MM SH 1/2 CIR VCP416H

## (undated) DEVICE — SOLUTION IRRIG 3000ML STRL H2O USP UROMATIC PLAS CONT

## (undated) DEVICE — PROTECTOR EYE PT SELF ADH NS OPT GRD LF

## (undated) DEVICE — CATHETER URETH 12FR L16IN RED RUB INTMIT ROB MOD BARDX

## (undated) DEVICE — STRIP,CLOSURE,WOUND,MEDI-STRIP,1/4X3: Brand: MEDLINE

## (undated) DEVICE — CATHETER F BLLN 5CC 16FR 2 W HYDRGEL COAT LESS TRAUM LUB

## (undated) DEVICE — ARM DRAPE

## (undated) DEVICE — URETERAL STENT
Type: IMPLANTABLE DEVICE | Site: URETER | Status: NON-FUNCTIONAL
Brand: CONTOUR™
Removed: 2024-04-29

## (undated) DEVICE — 1LYRTR 16FR10ML100%SIL UMS SNP: Brand: MEDLINE INDUSTRIES, INC.

## (undated) DEVICE — GAUZE,SPONGE,2"X2",8PLY,STERILE,LF,2'S: Brand: MEDLINE

## (undated) DEVICE — SUTURE VICRYL + SZ 0 L27IN ABSRB WHT CT-2 1/2 CIR TAPERPOINT VCP270H

## (undated) DEVICE — Device

## (undated) DEVICE — GAUZE,PACKING STRIP,IODOFORM,2"X5YD,STRL: Brand: CURAD

## (undated) DEVICE — JELLY LUBRICATING 2.7GM H2O SOL GREASELESS E-Z

## (undated) DEVICE — AGENT HEMSTAT 3GM PURIFIED PLNT STARCH PWD ABSRB ARISTA AH

## (undated) DEVICE — TRANSFER SET 3": Brand: MEDLINE INDUSTRIES, INC.

## (undated) DEVICE — DRAPE,REIN 53X77,STERILE: Brand: MEDLINE

## (undated) DEVICE — SUTURE ABSORBABLE L 18 IN SZ 4-0 NDL L 19 MM POLYGLACTIN 910 36/BX

## (undated) DEVICE — TROCAR: Brand: KII FIOS FIRST ENTRY